# Patient Record
Sex: MALE | Race: WHITE | Employment: FULL TIME | URBAN - METROPOLITAN AREA
[De-identification: names, ages, dates, MRNs, and addresses within clinical notes are randomized per-mention and may not be internally consistent; named-entity substitution may affect disease eponyms.]

---

## 2018-07-26 ENCOUNTER — HOSPITAL ENCOUNTER (OUTPATIENT)
Age: 58
Discharge: HOME | End: 2018-07-26
Attending: INTERNAL MEDICINE
Payer: COMMERCIAL

## 2018-07-26 DIAGNOSIS — K86.2 PANCREAS CYST: ICD-10-CM

## 2018-07-26 DIAGNOSIS — R93.3 ABNORMAL FINDINGS ON DIAGNOSTIC IMAGING OF DIGESTIVE SYSTEM: ICD-10-CM

## 2018-07-26 LAB — CREAT UR-MCNC: 1.1 MG/DL (ref 0.6–1.3)

## 2018-07-26 PROCEDURE — 74183 MRI ABD W/O CNTR FLWD CNTR: CPT

## 2018-07-26 PROCEDURE — A9585 GADOBUTROL INJECTION: HCPCS | Performed by: INTERNAL MEDICINE

## 2018-07-26 PROCEDURE — 82565 ASSAY OF CREATININE: CPT

## 2018-07-26 PROCEDURE — 74011250636 HC RX REV CODE- 250/636: Performed by: INTERNAL MEDICINE

## 2018-07-26 RX ADMIN — GADOBUTROL 10 ML: 604.72 INJECTION INTRAVENOUS at 08:00

## 2019-04-16 PROBLEM — S61.209A WOUND, OPEN, FINGER: Status: ACTIVE | Noted: 2017-01-30

## 2019-04-16 PROBLEM — M54.9 BACKACHE: Status: ACTIVE | Noted: 2019-04-16

## 2021-03-29 PROBLEM — M75.121 COMPLETE ROTATOR CUFF TEAR OR RUPTURE OF RIGHT SHOULDER, NOT SPECIFIED AS TRAUMATIC: Status: ACTIVE | Noted: 2021-03-29

## 2022-06-16 RX ORDER — LOSARTAN POTASSIUM 25 MG/1
TABLET ORAL DAILY
COMMUNITY

## 2022-06-16 NOTE — PERIOP NOTES
PRE-SURGICAL INSTRUCTIONS        Patient's Name:  Jaime Bobby      Today's Date:  6/16/2022            Covid Testing Date and Time:    Surgery Date:  6/20/2022                1. Do NOT eat or drink anything, including candy, gum, or ice chips after midnight on 6/19/2022, unless you have specific instructions from your surgeon or anesthesia provider to do so.  2. You may brush your teeth before coming to the hospital.  3. No smoking 24 hours prior to the day of surgery. 4. No alcohol 24 hours prior to the day of surgery. 5. No recreational drugs for one week prior to the day of surgery. 6. Leave all valuables, including money/purse, at home. 7. Remove all jewelry, nail polish, acrylic nails, and makeup (including mascara); no lotions powders, deodorant, or perfume/cologne/after shave on the skin. 8. Follow instruction for Hibiclens washes and CHG wipes from surgeon's office. 9. Glasses/contact lenses and dentures may be worn to the hospital.  They will be removed prior to surgery. 10. Call your doctor if symptoms of a cold or illness develop within 24-48 hours prior to your surgery. 11.  If you are having an outpatient procedure, please make arrangements for a responsible ADULT TO [address] [place] [address] and stay with you for 24 hours after your surgery. 12. ONE VISITOR in the hospital at this time for outpatient procedures. Exceptions may be made for surgical admissions, per nursing unit guidelines      Special Instructions:      Bring list of CURRENT medications. Bring inhaler. Bring CPAP machine. Bring any pertinent legal medical records. Take these medications the morning of surgery with a sip of water: per MD  Follow physician instructions about insulin. Follow physician instructions about stopping anticoagulants. Complete bowel prep per MD instructions. On the day of surgery, come in the main entrance of [facility].   Let the  at the desk know you are there for surgery. A staff member will come escort you to the surgical area on the second floor. If you have any questions or concerns, please do not hesitate to call:     (Prior to the day of surgery) [facility] department:  [phone]   (Day of surgery) Pre-Op department:  [phone]    These surgical instructions were reviewed with patient during the [facility] phone call.

## 2022-06-19 ENCOUNTER — ANESTHESIA EVENT (OUTPATIENT)
Dept: ENDOSCOPY | Age: 62
End: 2022-06-19
Payer: COMMERCIAL

## 2022-06-20 ENCOUNTER — ANESTHESIA (OUTPATIENT)
Dept: ENDOSCOPY | Age: 62
End: 2022-06-20
Payer: COMMERCIAL

## 2022-06-20 ENCOUNTER — APPOINTMENT (OUTPATIENT)
Dept: PREADMISSION TESTING | Age: 62
End: 2022-06-20
Payer: COMMERCIAL

## 2022-06-20 ENCOUNTER — HOSPITAL ENCOUNTER (OUTPATIENT)
Age: 62
Setting detail: OUTPATIENT SURGERY
Discharge: HOME | End: 2022-06-20
Attending: INTERNAL MEDICINE | Admitting: INTERNAL MEDICINE
Payer: COMMERCIAL

## 2022-06-20 VITALS
OXYGEN SATURATION: 98 % | HEIGHT: 67 IN | TEMPERATURE: 99.3 F | RESPIRATION RATE: 12 BRPM | DIASTOLIC BLOOD PRESSURE: 83 MMHG | HEART RATE: 85 BPM | WEIGHT: 171.2 LBS | SYSTOLIC BLOOD PRESSURE: 155 MMHG | BODY MASS INDEX: 26.87 KG/M2

## 2022-06-20 LAB
ANION GAP SERPL CALC-SCNC: 5 MMOL/L (ref 3–18)
BUN SERPL-MCNC: 9 MG/DL (ref 7–18)
BUN/CREAT SERPL: 14 (ref 12–20)
CALCIUM SERPL-MCNC: 9.1 MG/DL (ref 8.5–10.1)
CHLORIDE SERPL-SCNC: 101 MMOL/L (ref 100–111)
CO2 SERPL-SCNC: 36 MMOL/L (ref 21–32)
COVID-19 RAPID TEST, COVR: NOT DETECTED
CREAT SERPL-MCNC: 0.64 MG/DL (ref 0.6–1.3)
GLUCOSE SERPL-MCNC: 99 MG/DL (ref 74–99)
POTASSIUM SERPL-SCNC: 2.4 MMOL/L (ref 3.5–5.5)
SODIUM SERPL-SCNC: 142 MMOL/L (ref 136–145)
SOURCE, COVRS: NORMAL

## 2022-06-20 PROCEDURE — 87635 SARS-COV-2 COVID-19 AMP PRB: CPT

## 2022-06-20 PROCEDURE — 94762 N-INVAS EAR/PLS OXIMTRY CONT: CPT

## 2022-06-20 PROCEDURE — 74011250636 HC RX REV CODE- 250/636: Performed by: NURSE ANESTHETIST, CERTIFIED REGISTERED

## 2022-06-20 PROCEDURE — 76040000019: Performed by: INTERNAL MEDICINE

## 2022-06-20 PROCEDURE — 2709999900 HC NON-CHARGEABLE SUPPLY: Performed by: INTERNAL MEDICINE

## 2022-06-20 PROCEDURE — 77030008565 HC TBNG SUC IRR ERBE -B: Performed by: INTERNAL MEDICINE

## 2022-06-20 PROCEDURE — 74011250636 HC RX REV CODE- 250/636: Performed by: ANESTHESIOLOGY

## 2022-06-20 PROCEDURE — 74011250636 HC RX REV CODE- 250/636: Performed by: STUDENT IN AN ORGANIZED HEALTH CARE EDUCATION/TRAINING PROGRAM

## 2022-06-20 PROCEDURE — 80048 BASIC METABOLIC PNL TOTAL CA: CPT

## 2022-06-20 PROCEDURE — 77010033678 HC OXYGEN DAILY

## 2022-06-20 PROCEDURE — 74011000250 HC RX REV CODE- 250: Performed by: NURSE ANESTHETIST, CERTIFIED REGISTERED

## 2022-06-20 PROCEDURE — 76060000031 HC ANESTHESIA FIRST 0.5 HR: Performed by: INTERNAL MEDICINE

## 2022-06-20 RX ORDER — SODIUM CHLORIDE, SODIUM LACTATE, POTASSIUM CHLORIDE, CALCIUM CHLORIDE 600; 310; 30; 20 MG/100ML; MG/100ML; MG/100ML; MG/100ML
75 INJECTION, SOLUTION INTRAVENOUS CONTINUOUS
Status: DISCONTINUED | OUTPATIENT
Start: 2022-06-20 | End: 2022-06-21 | Stop reason: HOSPADM

## 2022-06-20 RX ORDER — SODIUM CHLORIDE 0.9 % (FLUSH) 0.9 %
5-40 SYRINGE (ML) INJECTION EVERY 8 HOURS
Status: DISCONTINUED | OUTPATIENT
Start: 2022-06-20 | End: 2022-06-21 | Stop reason: HOSPADM

## 2022-06-20 RX ORDER — ONDANSETRON 2 MG/ML
4 INJECTION INTRAMUSCULAR; INTRAVENOUS ONCE
Status: DISCONTINUED | OUTPATIENT
Start: 2022-06-20 | End: 2022-06-21 | Stop reason: HOSPADM

## 2022-06-20 RX ORDER — SODIUM CHLORIDE 0.9 % (FLUSH) 0.9 %
5-40 SYRINGE (ML) INJECTION AS NEEDED
Status: DISCONTINUED | OUTPATIENT
Start: 2022-06-20 | End: 2022-06-21 | Stop reason: HOSPADM

## 2022-06-20 RX ORDER — POTASSIUM CHLORIDE 7.45 MG/ML
10 INJECTION INTRAVENOUS ONCE
Status: COMPLETED | OUTPATIENT
Start: 2022-06-20 | End: 2022-06-20

## 2022-06-20 RX ORDER — SODIUM CHLORIDE, SODIUM LACTATE, POTASSIUM CHLORIDE, CALCIUM CHLORIDE 600; 310; 30; 20 MG/100ML; MG/100ML; MG/100ML; MG/100ML
25 INJECTION, SOLUTION INTRAVENOUS CONTINUOUS
Status: DISCONTINUED | OUTPATIENT
Start: 2022-06-20 | End: 2022-06-21 | Stop reason: HOSPADM

## 2022-06-20 RX ORDER — ONDANSETRON 2 MG/ML
INJECTION INTRAMUSCULAR; INTRAVENOUS AS NEEDED
Status: DISCONTINUED | OUTPATIENT
Start: 2022-06-20 | End: 2022-06-20 | Stop reason: HOSPADM

## 2022-06-20 RX ORDER — POTASSIUM CHLORIDE 7.45 MG/ML
10 INJECTION INTRAVENOUS
Status: COMPLETED | OUTPATIENT
Start: 2022-06-20 | End: 2022-06-20

## 2022-06-20 RX ADMIN — POTASSIUM CHLORIDE 10 MEQ: 7.46 INJECTION, SOLUTION INTRAVENOUS at 16:11

## 2022-06-20 RX ADMIN — SODIUM CHLORIDE, PRESERVATIVE FREE 20 MG: 5 INJECTION INTRAVENOUS at 12:50

## 2022-06-20 RX ADMIN — ONDANSETRON 4 MG: 2 INJECTION INTRAMUSCULAR; INTRAVENOUS at 13:59

## 2022-06-20 RX ADMIN — SODIUM CHLORIDE, SODIUM LACTATE, POTASSIUM CHLORIDE, AND CALCIUM CHLORIDE 75 ML/HR: 600; 310; 30; 20 INJECTION, SOLUTION INTRAVENOUS at 12:50

## 2022-06-20 RX ADMIN — POTASSIUM CHLORIDE 10 MEQ: 7.46 INJECTION, SOLUTION INTRAVENOUS at 15:00

## 2022-06-20 RX ADMIN — POTASSIUM CHLORIDE 10 MEQ: 7.46 INJECTION, SOLUTION INTRAVENOUS at 13:21

## 2022-06-20 NOTE — ANESTHESIA PREPROCEDURE EVALUATION
Relevant Problems   NEUROLOGY   (+) Anxiety and depression   (+) Psychiatric disorder      CARDIOVASCULAR   (+) Hypertension   (+) Right bundle branch block      GASTROINTESTINAL   (+) GERD (gastroesophageal reflux disease)      ENDOCRINE   (+) Arthritis       Anesthetic History     PONV          Review of Systems / Medical History  Patient summary reviewed and pertinent labs reviewed    Pulmonary  Within defined limits                 Neuro/Psych         Psychiatric history     Cardiovascular    Hypertension        Dysrhythmias            GI/Hepatic/Renal     GERD: well controlled          Comments: Mass in recto-sigmoid region. Poor prep due to intolerance of prep. Lots of N/V. Also critically low potassium levels which we will address pre-procedure.  Endo/Other        Arthritis     Other Findings              Physical Exam    Airway  Mallampati: II  TM Distance: 4 - 6 cm    Mouth opening: Normal     Cardiovascular    Rhythm: regular  Rate: normal         Dental  No notable dental hx       Pulmonary  Breath sounds clear to auscultation               Abdominal  GI exam deferred       Other Findings            Anesthetic Plan    ASA: 3  Anesthesia type: MAC          Induction: Intravenous  Anesthetic plan and risks discussed with: Patient

## 2022-06-20 NOTE — ANESTHESIA POSTPROCEDURE EVALUATION
Procedure(s):  SIGMOIDOSCOPY FLEXIBLE.     MAC    Anesthesia Post Evaluation      Multimodal analgesia: multimodal analgesia used between 6 hours prior to anesthesia start to PACU discharge  Patient location during evaluation: PACU  Patient participation: complete - patient participated  Level of consciousness: awake and alert  Pain management: adequate  Airway patency: patent  Anesthetic complications: no  Cardiovascular status: acceptable  Respiratory status: acceptable  Hydration status: acceptable  Post anesthesia nausea and vomiting:  none  Final Post Anesthesia Temperature Assessment:  Normothermia (36.0-37.5 degrees C)      INITIAL Post-op Vital signs:   Vitals Value Taken Time   /83 06/20/22 1415   Temp 37.4 °C (99.3 °F) 06/20/22 1415   Pulse 85 06/20/22 1415   Resp 12 06/20/22 1415   SpO2 98 % 06/20/22 1415
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
## 2022-08-29 ENCOUNTER — APPOINTMENT (OUTPATIENT)
Dept: CT IMAGING | Age: 62
DRG: 392 | End: 2022-08-29
Attending: EMERGENCY MEDICINE
Payer: COMMERCIAL

## 2022-08-29 ENCOUNTER — HOSPITAL ENCOUNTER (INPATIENT)
Age: 62
LOS: 5 days | Discharge: HOME | DRG: 392 | End: 2022-09-03
Attending: EMERGENCY MEDICINE | Admitting: INTERNAL MEDICINE
Payer: COMMERCIAL

## 2022-08-29 DIAGNOSIS — K57.20 DIVERTICULITIS OF LARGE INTESTINE WITH ABSCESS WITHOUT BLEEDING: ICD-10-CM

## 2022-08-29 DIAGNOSIS — I63.9 CEREBELLAR INFARCT (HCC): Primary | ICD-10-CM

## 2022-08-29 DIAGNOSIS — E87.6 HYPOKALEMIA: ICD-10-CM

## 2022-08-29 DIAGNOSIS — E86.0 DEHYDRATION: ICD-10-CM

## 2022-08-29 DIAGNOSIS — N17.9 AKI (ACUTE KIDNEY INJURY) (HCC): ICD-10-CM

## 2022-08-29 PROBLEM — G45.9 TIA (TRANSIENT ISCHEMIC ATTACK): Status: ACTIVE | Noted: 2022-08-29

## 2022-08-29 PROBLEM — I95.9 HYPOTENSION: Status: ACTIVE | Noted: 2022-08-29

## 2022-08-29 LAB
ALBUMIN SERPL-MCNC: 2.9 G/DL (ref 3.4–5)
ALBUMIN/GLOB SERPL: 0.8 {RATIO} (ref 0.8–1.7)
ALP SERPL-CCNC: 85 U/L (ref 45–117)
ALT SERPL-CCNC: 28 U/L (ref 16–61)
ANION GAP SERPL CALC-SCNC: 4 MMOL/L (ref 3–18)
ANION GAP SERPL CALC-SCNC: 4 MMOL/L (ref 3–18)
APPEARANCE UR: ABNORMAL
AST SERPL-CCNC: 15 U/L (ref 10–38)
ATRIAL RATE: 72 BPM
BACTERIA URNS QL MICRO: ABNORMAL /HPF
BASOPHILS # BLD: 0.1 K/UL (ref 0–0.1)
BASOPHILS NFR BLD: 0 % (ref 0–2)
BILIRUB SERPL-MCNC: 0.3 MG/DL (ref 0.2–1)
BILIRUB UR QL: NEGATIVE
BUN SERPL-MCNC: 15 MG/DL (ref 7–18)
BUN SERPL-MCNC: 16 MG/DL (ref 7–18)
BUN/CREAT SERPL: 7 (ref 12–20)
BUN/CREAT SERPL: 9 (ref 12–20)
CALCIUM SERPL-MCNC: 7.7 MG/DL (ref 8.5–10.1)
CALCIUM SERPL-MCNC: 8.9 MG/DL (ref 8.5–10.1)
CALCULATED P AXIS, ECG09: 59 DEGREES
CALCULATED R AXIS, ECG10: -26 DEGREES
CALCULATED T AXIS, ECG11: 16 DEGREES
CHLORIDE SERPL-SCNC: 101 MMOL/L (ref 100–111)
CHLORIDE SERPL-SCNC: 97 MMOL/L (ref 100–111)
CO2 SERPL-SCNC: 33 MMOL/L (ref 21–32)
CO2 SERPL-SCNC: 36 MMOL/L (ref 21–32)
COLOR UR: ABNORMAL
CREAT SERPL-MCNC: 1.8 MG/DL (ref 0.6–1.3)
CREAT SERPL-MCNC: 2.16 MG/DL (ref 0.6–1.3)
DIAGNOSIS, 93000: NORMAL
DIFFERENTIAL METHOD BLD: ABNORMAL
EOSINOPHIL # BLD: 0.3 K/UL (ref 0–0.4)
EOSINOPHIL NFR BLD: 2 % (ref 0–5)
EPITH CASTS URNS QL MICRO: NEGATIVE /LPF (ref 0–5)
ERYTHROCYTE [DISTWIDTH] IN BLOOD BY AUTOMATED COUNT: 13.6 % (ref 11.6–14.5)
GLOBULIN SER CALC-MCNC: 3.6 G/DL (ref 2–4)
GLUCOSE BLD STRIP.AUTO-MCNC: 133 MG/DL (ref 70–110)
GLUCOSE SERPL-MCNC: 127 MG/DL (ref 74–99)
GLUCOSE SERPL-MCNC: 135 MG/DL (ref 74–99)
GLUCOSE UR STRIP.AUTO-MCNC: NEGATIVE MG/DL
HCT VFR BLD AUTO: 33.3 % (ref 36–48)
HGB BLD-MCNC: 11.1 G/DL (ref 13–16)
HGB UR QL STRIP: ABNORMAL
IMM GRANULOCYTES # BLD AUTO: 0.1 K/UL (ref 0–0.04)
IMM GRANULOCYTES NFR BLD AUTO: 1 % (ref 0–0.5)
KETONES UR QL STRIP.AUTO: ABNORMAL MG/DL
LEUKOCYTE ESTERASE UR QL STRIP.AUTO: ABNORMAL
LYMPHOCYTES # BLD: 1.2 K/UL (ref 0.9–3.6)
LYMPHOCYTES NFR BLD: 10 % (ref 21–52)
MAGNESIUM SERPL-MCNC: 1.8 MG/DL (ref 1.6–2.6)
MCH RBC QN AUTO: 29.1 PG (ref 24–34)
MCHC RBC AUTO-ENTMCNC: 33.3 G/DL (ref 31–37)
MCV RBC AUTO: 87.4 FL (ref 78–100)
MONOCYTES # BLD: 0.7 K/UL (ref 0.05–1.2)
MONOCYTES NFR BLD: 5 % (ref 3–10)
NEUTS SEG # BLD: 9.8 K/UL (ref 1.8–8)
NEUTS SEG NFR BLD: 82 % (ref 40–73)
NITRITE UR QL STRIP.AUTO: NEGATIVE
NRBC # BLD: 0 K/UL (ref 0–0.01)
NRBC BLD-RTO: 0 PER 100 WBC
P-R INTERVAL, ECG05: 144 MS
PH UR STRIP: 5.5 [PH] (ref 5–8)
PLATELET # BLD AUTO: 508 K/UL (ref 135–420)
PMV BLD AUTO: 9.8 FL (ref 9.2–11.8)
POTASSIUM SERPL-SCNC: 2.5 MMOL/L (ref 3.5–5.5)
POTASSIUM SERPL-SCNC: 2.7 MMOL/L (ref 3.5–5.5)
PROT SERPL-MCNC: 6.5 G/DL (ref 6.4–8.2)
PROT UR STRIP-MCNC: 300 MG/DL
Q-T INTERVAL, ECG07: 478 MS
QRS DURATION, ECG06: 178 MS
QTC CALCULATION (BEZET), ECG08: 523 MS
RBC # BLD AUTO: 3.81 M/UL (ref 4.35–5.65)
RBC #/AREA URNS HPF: ABNORMAL /HPF (ref 0–5)
SODIUM SERPL-SCNC: 137 MMOL/L (ref 136–145)
SODIUM SERPL-SCNC: 138 MMOL/L (ref 136–145)
SP GR UR REFRACTOMETRY: 1.02 (ref 1–1.03)
TROPONIN-HIGH SENSITIVITY: 46 NG/L (ref 0–78)
UROBILINOGEN UR QL STRIP.AUTO: 1 EU/DL (ref 0.2–1)
VENTRICULAR RATE, ECG03: 72 BPM
WBC # BLD AUTO: 12 K/UL (ref 4.6–13.2)
WBC URNS QL MICRO: ABNORMAL /HPF (ref 0–4)

## 2022-08-29 PROCEDURE — 96360 HYDRATION IV INFUSION INIT: CPT

## 2022-08-29 PROCEDURE — 85025 COMPLETE CBC W/AUTO DIFF WBC: CPT

## 2022-08-29 PROCEDURE — 87086 URINE CULTURE/COLONY COUNT: CPT

## 2022-08-29 PROCEDURE — 65660000004 HC RM CVT STEPDOWN

## 2022-08-29 PROCEDURE — 74011250637 HC RX REV CODE- 250/637: Performed by: STUDENT IN AN ORGANIZED HEALTH CARE EDUCATION/TRAINING PROGRAM

## 2022-08-29 PROCEDURE — 82962 GLUCOSE BLOOD TEST: CPT

## 2022-08-29 PROCEDURE — 99285 EMERGENCY DEPT VISIT HI MDM: CPT

## 2022-08-29 PROCEDURE — 74011000250 HC RX REV CODE- 250: Performed by: STUDENT IN AN ORGANIZED HEALTH CARE EDUCATION/TRAINING PROGRAM

## 2022-08-29 PROCEDURE — 84484 ASSAY OF TROPONIN QUANT: CPT

## 2022-08-29 PROCEDURE — 77030018842 HC SOL IRR SOD CL 9% BAXT -A

## 2022-08-29 PROCEDURE — 87077 CULTURE AEROBIC IDENTIFY: CPT

## 2022-08-29 PROCEDURE — 81001 URINALYSIS AUTO W/SCOPE: CPT

## 2022-08-29 PROCEDURE — 2709999900 HC NON-CHARGEABLE SUPPLY

## 2022-08-29 PROCEDURE — 70450 CT HEAD/BRAIN W/O DYE: CPT

## 2022-08-29 PROCEDURE — 74011000250 HC RX REV CODE- 250: Performed by: NURSE PRACTITIONER

## 2022-08-29 PROCEDURE — APPSS45 APP SPLIT SHARED TIME 31-45 MINUTES: Performed by: NURSE PRACTITIONER

## 2022-08-29 PROCEDURE — 87186 SC STD MICRODIL/AGAR DIL: CPT

## 2022-08-29 PROCEDURE — 74011250637 HC RX REV CODE- 250/637: Performed by: NURSE PRACTITIONER

## 2022-08-29 PROCEDURE — 93005 ELECTROCARDIOGRAM TRACING: CPT

## 2022-08-29 PROCEDURE — 80053 COMPREHEN METABOLIC PANEL: CPT

## 2022-08-29 PROCEDURE — 74011250636 HC RX REV CODE- 250/636: Performed by: EMERGENCY MEDICINE

## 2022-08-29 PROCEDURE — 74011250636 HC RX REV CODE- 250/636: Performed by: NURSE PRACTITIONER

## 2022-08-29 PROCEDURE — 74011250637 HC RX REV CODE- 250/637: Performed by: EMERGENCY MEDICINE

## 2022-08-29 PROCEDURE — 74011250636 HC RX REV CODE- 250/636: Performed by: STUDENT IN AN ORGANIZED HEALTH CARE EDUCATION/TRAINING PROGRAM

## 2022-08-29 PROCEDURE — 83735 ASSAY OF MAGNESIUM: CPT

## 2022-08-29 RX ORDER — SODIUM CHLORIDE 9 MG/ML
125 INJECTION, SOLUTION INTRAVENOUS CONTINUOUS
Status: DISPENSED | OUTPATIENT
Start: 2022-08-29 | End: 2022-08-30

## 2022-08-29 RX ORDER — POTASSIUM CHLORIDE 20 MEQ/1
40 TABLET, EXTENDED RELEASE ORAL
Status: COMPLETED | OUTPATIENT
Start: 2022-08-29 | End: 2022-08-29

## 2022-08-29 RX ORDER — POTASSIUM CHLORIDE 7.45 MG/ML
10 INJECTION INTRAVENOUS
Status: DISPENSED | OUTPATIENT
Start: 2022-08-29 | End: 2022-08-29

## 2022-08-29 RX ORDER — CALCIUM CARB/MAGNESIUM CARB 311-232MG
5 TABLET ORAL
Status: DISCONTINUED | OUTPATIENT
Start: 2022-08-29 | End: 2022-09-03 | Stop reason: HOSPADM

## 2022-08-29 RX ORDER — POTASSIUM CHLORIDE 7.45 MG/ML
10 INJECTION INTRAVENOUS
Status: COMPLETED | OUTPATIENT
Start: 2022-08-29 | End: 2022-08-29

## 2022-08-29 RX ORDER — GABAPENTIN 300 MG/1
300 CAPSULE ORAL
Status: DISCONTINUED | OUTPATIENT
Start: 2022-08-29 | End: 2022-09-03 | Stop reason: HOSPADM

## 2022-08-29 RX ORDER — NORTRIPTYLINE HYDROCHLORIDE 25 MG/1
25 CAPSULE ORAL
Status: DISCONTINUED | OUTPATIENT
Start: 2022-08-29 | End: 2022-09-03 | Stop reason: HOSPADM

## 2022-08-29 RX ORDER — MELATONIN 5 MG
5 CAPSULE ORAL
COMMUNITY

## 2022-08-29 RX ORDER — ONDANSETRON 2 MG/ML
4 INJECTION INTRAMUSCULAR; INTRAVENOUS ONCE
Status: COMPLETED | OUTPATIENT
Start: 2022-08-29 | End: 2022-08-29

## 2022-08-29 RX ORDER — HEPARIN SODIUM 5000 [USP'U]/ML
5000 INJECTION, SOLUTION INTRAVENOUS; SUBCUTANEOUS EVERY 8 HOURS
Status: DISCONTINUED | OUTPATIENT
Start: 2022-08-29 | End: 2022-09-03 | Stop reason: HOSPADM

## 2022-08-29 RX ORDER — CALCIUM CARB/MAGNESIUM CARB 311-232MG
5 TABLET ORAL
Status: DISCONTINUED | OUTPATIENT
Start: 2022-08-29 | End: 2022-08-29

## 2022-08-29 RX ORDER — ASPIRIN 325 MG
325 TABLET ORAL DAILY
Status: DISCONTINUED | OUTPATIENT
Start: 2022-08-30 | End: 2022-09-02

## 2022-08-29 RX ORDER — CALCIUM CARBONATE 200(500)MG
200 TABLET,CHEWABLE ORAL
Status: DISCONTINUED | OUTPATIENT
Start: 2022-08-29 | End: 2022-09-02

## 2022-08-29 RX ORDER — POTASSIUM CHLORIDE 7.45 MG/ML
10 INJECTION INTRAVENOUS
Status: ACTIVE | OUTPATIENT
Start: 2022-08-30 | End: 2022-08-30

## 2022-08-29 RX ORDER — PROMETHAZINE HYDROCHLORIDE 12.5 MG/1
25 TABLET ORAL
Status: DISCONTINUED | OUTPATIENT
Start: 2022-08-29 | End: 2022-09-03 | Stop reason: HOSPADM

## 2022-08-29 RX ORDER — LORAZEPAM 1 MG/1
1 TABLET ORAL DAILY
Status: DISCONTINUED | OUTPATIENT
Start: 2022-08-30 | End: 2022-09-03 | Stop reason: HOSPADM

## 2022-08-29 RX ORDER — ATORVASTATIN CALCIUM 40 MG/1
80 TABLET, FILM COATED ORAL
Status: DISCONTINUED | OUTPATIENT
Start: 2022-08-29 | End: 2022-09-02

## 2022-08-29 RX ORDER — LORAZEPAM 2 MG/ML
0.5 INJECTION, SOLUTION INTRAMUSCULAR; INTRAVENOUS
Status: DISCONTINUED | OUTPATIENT
Start: 2022-08-29 | End: 2022-09-03 | Stop reason: HOSPADM

## 2022-08-29 RX ORDER — HYDRALAZINE HYDROCHLORIDE 20 MG/ML
10 INJECTION INTRAMUSCULAR; INTRAVENOUS
Status: DISCONTINUED | OUTPATIENT
Start: 2022-08-29 | End: 2022-09-03 | Stop reason: HOSPADM

## 2022-08-29 RX ORDER — ASPIRIN 325 MG
325 TABLET ORAL ONCE
Status: COMPLETED | OUTPATIENT
Start: 2022-08-29 | End: 2022-08-29

## 2022-08-29 RX ADMIN — Medication 5 MG: at 23:15

## 2022-08-29 RX ADMIN — LORAZEPAM 0.5 MG: 2 INJECTION, SOLUTION INTRAMUSCULAR; INTRAVENOUS at 23:30

## 2022-08-29 RX ADMIN — POTASSIUM CHLORIDE 10 MEQ: 7.46 INJECTION, SOLUTION INTRAVENOUS at 21:21

## 2022-08-29 RX ADMIN — NORTRIPTYLINE HYDROCHLORIDE 25 MG: 25 CAPSULE ORAL at 23:15

## 2022-08-29 RX ADMIN — POTASSIUM CHLORIDE 10 MEQ: 7.46 INJECTION, SOLUTION INTRAVENOUS at 18:57

## 2022-08-29 RX ADMIN — SODIUM CHLORIDE 125 ML/HR: 900 INJECTION, SOLUTION INTRAVENOUS at 21:15

## 2022-08-29 RX ADMIN — ONDANSETRON 4 MG: 2 INJECTION INTRAMUSCULAR; INTRAVENOUS at 18:32

## 2022-08-29 RX ADMIN — FAMOTIDINE 20 MG: 10 INJECTION, SOLUTION INTRAVENOUS at 23:18

## 2022-08-29 RX ADMIN — SODIUM CHLORIDE 125 ML/HR: 900 INJECTION, SOLUTION INTRAVENOUS at 16:26

## 2022-08-29 RX ADMIN — SODIUM CHLORIDE 1000 ML: 900 INJECTION, SOLUTION INTRAVENOUS at 15:04

## 2022-08-29 RX ADMIN — POTASSIUM CHLORIDE 10 MEQ: 7.46 INJECTION, SOLUTION INTRAVENOUS at 15:04

## 2022-08-29 RX ADMIN — HEPARIN SODIUM 5000 UNITS: 5000 INJECTION INTRAVENOUS; SUBCUTANEOUS at 23:23

## 2022-08-29 RX ADMIN — POTASSIUM CHLORIDE 40 MEQ: 1500 TABLET, EXTENDED RELEASE ORAL at 14:58

## 2022-08-29 RX ADMIN — CALCIUM CARBONATE (ANTACID) CHEW TAB 500 MG 200 MG: 500 CHEW TAB at 21:15

## 2022-08-29 RX ADMIN — ATORVASTATIN CALCIUM 80 MG: 40 TABLET, FILM COATED ORAL at 23:15

## 2022-08-29 RX ADMIN — SODIUM CHLORIDE 1000 ML: 900 INJECTION, SOLUTION INTRAVENOUS at 14:58

## 2022-08-29 RX ADMIN — POTASSIUM CHLORIDE 10 MEQ: 7.46 INJECTION, SOLUTION INTRAVENOUS at 23:31

## 2022-08-29 RX ADMIN — WATER 1 G: 1 INJECTION INTRAMUSCULAR; INTRAVENOUS; SUBCUTANEOUS at 18:32

## 2022-08-29 RX ADMIN — ASPIRIN 325 MG ORAL TABLET 325 MG: 325 PILL ORAL at 15:56

## 2022-08-29 RX ADMIN — GABAPENTIN 300 MG: 300 CAPSULE ORAL at 23:15

## 2022-08-29 NOTE — ED TRIAGE NOTES
Patient's wife states that they just returned from a cruise to [facility], and [facility]. States that patient stayed in state room in bed and did not eat during cruise. States that patient was stressed about missing work. Wife states that patient experienced fall today after knees gave way. She states patient's mentation is off today.

## 2022-08-29 NOTE — ED NOTES
Pt medicated as per MAR. Pt sitting in semi flowers position, call bell within reach, wife at bedside.
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
## 2022-08-30 ENCOUNTER — APPOINTMENT (OUTPATIENT)
Dept: MRI IMAGING | Age: 62
DRG: 392 | End: 2022-08-30
Attending: STUDENT IN AN ORGANIZED HEALTH CARE EDUCATION/TRAINING PROGRAM
Payer: COMMERCIAL

## 2022-08-30 ENCOUNTER — APPOINTMENT (OUTPATIENT)
Dept: NON INVASIVE DIAGNOSTICS | Age: 62
DRG: 392 | End: 2022-08-30
Attending: STUDENT IN AN ORGANIZED HEALTH CARE EDUCATION/TRAINING PROGRAM
Payer: COMMERCIAL

## 2022-08-30 ENCOUNTER — APPOINTMENT (OUTPATIENT)
Dept: CT IMAGING | Age: 62
DRG: 392 | End: 2022-08-30
Attending: HOSPITALIST
Payer: COMMERCIAL

## 2022-08-30 LAB
ANION GAP SERPL CALC-SCNC: 8 MMOL/L (ref 3–18)
BUN SERPL-MCNC: 13 MG/DL (ref 7–18)
BUN/CREAT SERPL: 11 (ref 12–20)
CALCIUM SERPL-MCNC: 7.9 MG/DL (ref 8.5–10.1)
CHLORIDE SERPL-SCNC: 105 MMOL/L (ref 100–111)
CHOLEST SERPL-MCNC: 166 MG/DL
CO2 SERPL-SCNC: 29 MMOL/L (ref 21–32)
CREAT SERPL-MCNC: 1.17 MG/DL (ref 0.6–1.3)
ECHO AO ASC DIAM: 3.5 CM
ECHO AO ASCENDING AORTA INDEX: 1.83 CM/M2
ECHO AO ROOT DIAM: 3.3 CM
ECHO AO ROOT INDEX: 1.73 CM/M2
ECHO AV AREA PEAK VELOCITY: 1.8 CM2
ECHO AV AREA VTI: 1.8 CM2
ECHO AV AREA/BSA PEAK VELOCITY: 0.9 CM2/M2
ECHO AV AREA/BSA VTI: 0.9 CM2/M2
ECHO AV MEAN GRADIENT: 13 MMHG
ECHO AV MEAN VELOCITY: 1.7 M/S
ECHO AV PEAK GRADIENT: 23 MMHG
ECHO AV PEAK VELOCITY: 2.4 M/S
ECHO AV VELOCITY RATIO: 0.5
ECHO AV VTI: 44.7 CM
ECHO LA VOL 2C: 66 ML (ref 18–58)
ECHO LA VOL 4C: 59 ML (ref 18–58)
ECHO LA VOLUME AREA LENGTH: 70 ML
ECHO LA VOLUME INDEX A2C: 35 ML/M2 (ref 16–34)
ECHO LA VOLUME INDEX A4C: 31 ML/M2 (ref 16–34)
ECHO LA VOLUME INDEX AREA LENGTH: 37 ML/M2 (ref 16–34)
ECHO LV E' LATERAL VELOCITY: 10 CM/S
ECHO LV E' SEPTAL VELOCITY: 6 CM/S
ECHO LV FRACTIONAL SHORTENING: 21 % (ref 28–44)
ECHO LV INTERNAL DIMENSION DIASTOLE INDEX: 2.46 CM/M2
ECHO LV INTERNAL DIMENSION DIASTOLIC: 4.7 CM (ref 4.2–5.9)
ECHO LV INTERNAL DIMENSION SYSTOLIC INDEX: 1.94 CM/M2
ECHO LV INTERNAL DIMENSION SYSTOLIC: 3.7 CM
ECHO LV IVSD: 1.3 CM (ref 0.6–1)
ECHO LV MASS 2D: 224.8 G (ref 88–224)
ECHO LV MASS INDEX 2D: 117.7 G/M2 (ref 49–115)
ECHO LV POSTERIOR WALL DIASTOLIC: 1.2 CM (ref 0.6–1)
ECHO LV RELATIVE WALL THICKNESS RATIO: 0.51
ECHO LVOT AREA: 3.5 CM2
ECHO LVOT AV VTI INDEX: 0.53
ECHO LVOT DIAM: 2.1 CM
ECHO LVOT MEAN GRADIENT: 4 MMHG
ECHO LVOT PEAK GRADIENT: 6 MMHG
ECHO LVOT PEAK VELOCITY: 1.2 M/S
ECHO LVOT STROKE VOLUME INDEX: 43.3 ML/M2
ECHO LVOT SV: 82.7 ML
ECHO LVOT VTI: 23.9 CM
ECHO MV A VELOCITY: 0.84 M/S
ECHO MV E DECELERATION TIME (DT): 273.2 MS
ECHO MV E VELOCITY: 0.71 M/S
ECHO MV E/A RATIO: 0.85
ECHO MV E/E' LATERAL: 7.1
ECHO MV E/E' RATIO (AVERAGED): 9.47
ECHO MV E/E' SEPTAL: 11.83
ECHO RV TAPSE: 2.1 CM (ref 1.7–?)
ECHO TV REGURGITANT MAX VELOCITY: 2.66 M/S
ECHO TV REGURGITANT PEAK GRADIENT: 28 MMHG
ERYTHROCYTE [DISTWIDTH] IN BLOOD BY AUTOMATED COUNT: 13.7 % (ref 11.6–14.5)
EST. AVERAGE GLUCOSE BLD GHB EST-MCNC: 111 MG/DL
GLUCOSE SERPL-MCNC: 102 MG/DL (ref 74–99)
HBA1C MFR BLD: 5.5 % (ref 4.2–5.6)
HCT VFR BLD AUTO: 32 % (ref 36–48)
HDLC SERPL-MCNC: 32 MG/DL (ref 40–60)
HDLC SERPL: 5.2 {RATIO} (ref 0–5)
HGB BLD-MCNC: 10.6 G/DL (ref 13–16)
LDLC SERPL CALC-MCNC: 111.2 MG/DL (ref 0–100)
LIPID PROFILE,FLP: ABNORMAL
MCH RBC QN AUTO: 28.7 PG (ref 24–34)
MCHC RBC AUTO-ENTMCNC: 33.1 G/DL (ref 31–37)
MCV RBC AUTO: 86.7 FL (ref 78–100)
NRBC # BLD: 0 K/UL (ref 0–0.01)
NRBC BLD-RTO: 0 PER 100 WBC
PLATELET # BLD AUTO: 442 K/UL (ref 135–420)
PMV BLD AUTO: 10.4 FL (ref 9.2–11.8)
POTASSIUM SERPL-SCNC: 3.3 MMOL/L (ref 3.5–5.5)
RBC # BLD AUTO: 3.69 M/UL (ref 4.35–5.65)
SODIUM SERPL-SCNC: 142 MMOL/L (ref 136–145)
TRIGL SERPL-MCNC: 114 MG/DL (ref ?–150)
VLDLC SERPL CALC-MCNC: 22.8 MG/DL
WBC # BLD AUTO: 11.8 K/UL (ref 4.6–13.2)

## 2022-08-30 PROCEDURE — 74011000250 HC RX REV CODE- 250: Performed by: NURSE PRACTITIONER

## 2022-08-30 PROCEDURE — 2709999900 HC NON-CHARGEABLE SUPPLY

## 2022-08-30 PROCEDURE — 99232 SBSQ HOSP IP/OBS MODERATE 35: CPT | Performed by: HOSPITALIST

## 2022-08-30 PROCEDURE — 74011250637 HC RX REV CODE- 250/637: Performed by: EMERGENCY MEDICINE

## 2022-08-30 PROCEDURE — 74011250637 HC RX REV CODE- 250/637: Performed by: NURSE PRACTITIONER

## 2022-08-30 PROCEDURE — 70551 MRI BRAIN STEM W/O DYE: CPT

## 2022-08-30 PROCEDURE — 74177 CT ABD & PELVIS W/CONTRAST: CPT

## 2022-08-30 PROCEDURE — 74011250637 HC RX REV CODE- 250/637: Performed by: STUDENT IN AN ORGANIZED HEALTH CARE EDUCATION/TRAINING PROGRAM

## 2022-08-30 PROCEDURE — 97535 SELF CARE MNGMENT TRAINING: CPT

## 2022-08-30 PROCEDURE — 80061 LIPID PANEL: CPT

## 2022-08-30 PROCEDURE — 99222 1ST HOSP IP/OBS MODERATE 55: CPT | Performed by: PSYCHIATRY & NEUROLOGY

## 2022-08-30 PROCEDURE — 74011250636 HC RX REV CODE- 250/636: Performed by: STUDENT IN AN ORGANIZED HEALTH CARE EDUCATION/TRAINING PROGRAM

## 2022-08-30 PROCEDURE — 93306 TTE W/DOPPLER COMPLETE: CPT

## 2022-08-30 PROCEDURE — 74011000250 HC RX REV CODE- 250: Performed by: HOSPITALIST

## 2022-08-30 PROCEDURE — 97530 THERAPEUTIC ACTIVITIES: CPT

## 2022-08-30 PROCEDURE — 80048 BASIC METABOLIC PNL TOTAL CA: CPT

## 2022-08-30 PROCEDURE — 74011000636 HC RX REV CODE- 636: Performed by: HOSPITALIST

## 2022-08-30 PROCEDURE — 74011250636 HC RX REV CODE- 250/636: Performed by: NURSE PRACTITIONER

## 2022-08-30 PROCEDURE — 92610 EVALUATE SWALLOWING FUNCTION: CPT

## 2022-08-30 PROCEDURE — 97161 PT EVAL LOW COMPLEX 20 MIN: CPT

## 2022-08-30 PROCEDURE — 85027 COMPLETE CBC AUTOMATED: CPT

## 2022-08-30 PROCEDURE — 97166 OT EVAL MOD COMPLEX 45 MIN: CPT

## 2022-08-30 PROCEDURE — 65660000004 HC RM CVT STEPDOWN

## 2022-08-30 PROCEDURE — 83036 HEMOGLOBIN GLYCOSYLATED A1C: CPT

## 2022-08-30 PROCEDURE — 74011250637 HC RX REV CODE- 250/637: Performed by: HOSPITALIST

## 2022-08-30 PROCEDURE — 92526 ORAL FUNCTION THERAPY: CPT

## 2022-08-30 PROCEDURE — 36415 COLL VENOUS BLD VENIPUNCTURE: CPT

## 2022-08-30 RX ORDER — FAMOTIDINE 20 MG/1
20 TABLET, FILM COATED ORAL 2 TIMES DAILY
Status: DISCONTINUED | OUTPATIENT
Start: 2022-08-30 | End: 2022-09-03 | Stop reason: HOSPADM

## 2022-08-30 RX ORDER — POTASSIUM CHLORIDE 20 MEQ/1
40 TABLET, EXTENDED RELEASE ORAL
Status: COMPLETED | OUTPATIENT
Start: 2022-08-30 | End: 2022-08-30

## 2022-08-30 RX ORDER — LANOLIN ALCOHOL/MO/W.PET/CERES
100 CREAM (GRAM) TOPICAL DAILY
Status: DISCONTINUED | OUTPATIENT
Start: 2022-08-31 | End: 2022-09-03 | Stop reason: HOSPADM

## 2022-08-30 RX ORDER — SODIUM CHLORIDE 9 MG/ML
10 INJECTION INTRAMUSCULAR; INTRAVENOUS; SUBCUTANEOUS
Status: COMPLETED | OUTPATIENT
Start: 2022-08-30 | End: 2022-08-30

## 2022-08-30 RX ORDER — THERA TABS 400 MCG
1 TAB ORAL DAILY
Status: DISCONTINUED | OUTPATIENT
Start: 2022-08-31 | End: 2022-09-03 | Stop reason: HOSPADM

## 2022-08-30 RX ORDER — AMLODIPINE BESYLATE 10 MG/1
10 TABLET ORAL DAILY
Status: DISCONTINUED | OUTPATIENT
Start: 2022-08-30 | End: 2022-09-03 | Stop reason: HOSPADM

## 2022-08-30 RX ADMIN — CALCIUM CARBONATE (ANTACID) CHEW TAB 500 MG 200 MG: 500 CHEW TAB at 08:23

## 2022-08-30 RX ADMIN — CALCIUM CARBONATE (ANTACID) CHEW TAB 500 MG 200 MG: 500 CHEW TAB at 17:22

## 2022-08-30 RX ADMIN — CALCIUM CARBONATE (ANTACID) CHEW TAB 500 MG 200 MG: 500 CHEW TAB at 12:19

## 2022-08-30 RX ADMIN — IOPAMIDOL 100 ML: 755 INJECTION, SOLUTION INTRAVENOUS at 16:48

## 2022-08-30 RX ADMIN — HYDRALAZINE HYDROCHLORIDE 10 MG: 20 INJECTION, SOLUTION INTRAMUSCULAR; INTRAVENOUS at 00:46

## 2022-08-30 RX ADMIN — SODIUM CHLORIDE 10 ML: 9 INJECTION INTRAMUSCULAR; INTRAVENOUS; SUBCUTANEOUS at 15:25

## 2022-08-30 RX ADMIN — ATORVASTATIN CALCIUM 80 MG: 40 TABLET, FILM COATED ORAL at 21:58

## 2022-08-30 RX ADMIN — WATER 1 G: 1 INJECTION INTRAMUSCULAR; INTRAVENOUS; SUBCUTANEOUS at 17:22

## 2022-08-30 RX ADMIN — NORTRIPTYLINE HYDROCHLORIDE 25 MG: 25 CAPSULE ORAL at 21:59

## 2022-08-30 RX ADMIN — HEPARIN SODIUM 5000 UNITS: 5000 INJECTION INTRAVENOUS; SUBCUTANEOUS at 05:39

## 2022-08-30 RX ADMIN — LORAZEPAM 1 MG: 1 TABLET ORAL at 08:23

## 2022-08-30 RX ADMIN — AMLODIPINE BESYLATE 10 MG: 10 TABLET ORAL at 04:11

## 2022-08-30 RX ADMIN — POTASSIUM CHLORIDE 10 MEQ: 7.46 INJECTION, SOLUTION INTRAVENOUS at 00:27

## 2022-08-30 RX ADMIN — HEPARIN SODIUM 5000 UNITS: 5000 INJECTION INTRAVENOUS; SUBCUTANEOUS at 22:00

## 2022-08-30 RX ADMIN — LORAZEPAM 0.5 MG: 2 INJECTION, SOLUTION INTRAMUSCULAR; INTRAVENOUS at 21:59

## 2022-08-30 RX ADMIN — FAMOTIDINE 20 MG: 20 TABLET ORAL at 10:40

## 2022-08-30 RX ADMIN — HEPARIN SODIUM 5000 UNITS: 5000 INJECTION INTRAVENOUS; SUBCUTANEOUS at 13:38

## 2022-08-30 RX ADMIN — POTASSIUM CHLORIDE 40 MEQ: 1500 TABLET, EXTENDED RELEASE ORAL at 07:34

## 2022-08-30 RX ADMIN — FAMOTIDINE 20 MG: 20 TABLET ORAL at 17:22

## 2022-08-30 RX ADMIN — ASPIRIN 325 MG ORAL TABLET 325 MG: 325 PILL ORAL at 08:23

## 2022-08-30 RX ADMIN — GABAPENTIN 300 MG: 300 CAPSULE ORAL at 21:58

## 2022-08-30 RX ADMIN — Medication 5 MG: at 21:58
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
## 2022-08-30 NOTE — PROGRESS NOTES
Problem: Falls - Risk of  Goal: *Absence of Falls  Description: Document Yuan Sol Fall Risk and appropriate interventions in the flowsheet.   Outcome: Progressing Towards Goal  Note: Fall Risk Interventions:  Mobility Interventions: Bed/chair exit alarm, Communicate number of staff needed for ambulation/transfer, Patient to call before getting OOB    Mentation Interventions: Adequate sleep, hydration, pain control, Bed/chair exit alarm, Door open when patient unattended, Family/sitter at bedside, Room close to nurse's station, Toileting rounds, Update white board    Medication Interventions: Bed/chair exit alarm, Patient to call before getting OOB, Teach patient to arise slowly         History of Falls Interventions: Bed/chair exit alarm, Door open when patient unattended, Room close to nurse's station         Problem: Patient Education: Go to Patient Education Activity  Goal: Patient/Family Education  Outcome: Progressing Towards Goal     Problem: Patient Education: Go to Patient Education Activity  Goal: Patient/Family Education  Outcome: Progressing Towards Goal     Problem: TIA/CVA Stroke: 0-24 hours  Goal: Off Pathway (Use only if patient is Off Pathway)  Outcome: Progressing Towards Goal  Goal: Activity/Safety  Outcome: Progressing Towards Goal  Goal: Consults, if ordered  Outcome: Progressing Towards Goal  Goal: Diagnostic Test/Procedures  Outcome: Progressing Towards Goal  Goal: Nutrition/Diet  Outcome: Progressing Towards Goal  Goal: Discharge Planning  Outcome: Progressing Towards Goal  Goal: Medications  Outcome: Progressing Towards Goal  Goal: Respiratory  Outcome: Progressing Towards Goal  Goal: Treatments/Interventions/Procedures  Outcome: Progressing Towards Goal  Goal: Minimize risk of bleeding post-thrombolytic infusion  Outcome: Progressing Towards Goal  Goal: Monitor for complications post-thrombolytic infusion  Outcome: Progressing Towards Goal  Goal: Psychosocial  Outcome: Progressing Towards Goal  Goal: *Hemodynamically stable  Outcome: Progressing Towards Goal  Goal: *Neurologically stable  Description: Absence of additional neurological deficits    Outcome: Progressing Towards Goal  Goal: *Verbalizes anxiety and depression are reduced or absent  Outcome: Progressing Towards Goal  Goal: *Absence of Signs of Aspiration on Current Diet  Outcome: Progressing Towards Goal  Goal: *Absence of deep venous thrombosis signs and symptoms(Stroke Metric)  Outcome: Progressing Towards Goal  Goal: *Ability to perform ADLs and demonstrates progressive mobility and function  Outcome: Progressing Towards Goal  Goal: *Stroke education started(Stroke Metric)  Outcome: Progressing Towards Goal  Goal: *Dysphagia screen performed(Stroke Metric)  Outcome: Progressing Towards Goal  Goal: *Rehab consulted(Stroke Metric)  Outcome: Progressing Towards Goal     Problem: TIA/CVA Stroke: Day 2 Until Discharge  Goal: Off Pathway (Use only if patient is Off Pathway)  Outcome: Progressing Towards Goal  Goal: Activity/Safety  Outcome: Progressing Towards Goal  Goal: Diagnostic Test/Procedures  Outcome: Progressing Towards Goal  Goal: Nutrition/Diet  Outcome: Progressing Towards Goal  Goal: Discharge Planning  Outcome: Progressing Towards Goal  Goal: Medications  Outcome: Progressing Towards Goal  Goal: Respiratory  Outcome: Progressing Towards Goal  Goal: Treatments/Interventions/Procedures  Outcome: Progressing Towards Goal  Goal: Psychosocial  Outcome: Progressing Towards Goal  Goal: *Verbalizes anxiety and depression are reduced or absent  Outcome: Progressing Towards Goal  Goal: *Absence of aspiration  Outcome: Progressing Towards Goal  Goal: *Absence of deep venous thrombosis signs and symptoms(Stroke Metric)  Outcome: Progressing Towards Goal  Goal: *Optimal pain control at patient's stated goal  Outcome: Progressing Towards Goal  Goal: *Tolerating diet  Outcome: Progressing Towards Goal  Goal: *Ability to perform ADLs and demonstrates progressive mobility and function  Outcome: Progressing Towards Goal  Goal: *Stroke education continued(Stroke Metric)  Outcome: Progressing Towards Goal     Problem: Ischemic Stroke: Discharge Outcomes  Goal: *Verbalizes anxiety and depression are reduced or absent  Outcome: Progressing Towards Goal  Goal: *Verbalize understanding of risk factor modification(Stroke Metric)  Outcome: Progressing Towards Goal  Goal: *Hemodynamically stable  Outcome: Progressing Towards Goal  Goal: *Absence of aspiration pneumonia  Outcome: Progressing Towards Goal  Goal: *Aware of needed dietary changes  Outcome: Progressing Towards Goal  Goal: *Verbalize understanding of prescribed medications including anti-coagulants, anti-lipid, and/or anti-platelets(Stroke Metric)  Outcome: Progressing Towards Goal  Goal: *Tolerating diet  Outcome: Progressing Towards Goal  Goal: *Aware of follow-up diagnostics related to anticoagulants  Outcome: Progressing Towards Goal  Goal: *Ability to perform ADLs and demonstrates progressive mobility and function  Outcome: Progressing Towards Goal  Goal: *Absence of DVT(Stroke Metric)  Outcome: Progressing Towards Goal  Goal: *Absence of aspiration  Outcome: Progressing Towards Goal  Goal: *Optimal pain control at patient's stated goal  Outcome: Progressing Towards Goal  Goal: *Home safety concerns addressed  Outcome: Progressing Towards Goal  Goal: *Describes available resources and support systems  Outcome: Progressing Towards Goal  Goal: *Verbalizes understanding of activation of EMS(911) for stroke symptoms(Stroke Metric)  Outcome: Progressing Towards Goal  Goal: *Understands and describes signs and symptoms to report to providers(Stroke Metric)  Outcome: Progressing Towards Goal  Goal: *Neurolgocially stable (absence of additional neurological deficits)  Outcome: Progressing Towards Goal  Goal: *Verbalizes importance of follow-up with primary care physician(Stroke Metric)  Outcome: Progressing Towards Goal  Goal: *Smoking cessation discussed,if applicable(Stroke Metric)  Outcome: Progressing Towards Goal  Goal: *Depression screening completed(Stroke Metric)  Outcome: Progressing Towards Goal     Problem: Injury - Risk of, Adverse Drug Event  Goal: *Absence of adverse drug events  Outcome: Progressing Towards Goal  Goal: *Absence of medication errors  Outcome: Progressing Towards Goal  Goal: *Knowledge of prescribed medications  Outcome: Progressing Towards Goal     Problem: Patient Education: Go to Patient Education Activity  Goal: Patient/Family Education  Outcome: Progressing Towards Goal     Problem: Pain  Goal: *Control of Pain  Outcome: Progressing Towards Goal  Goal: *PALLIATIVE CARE:  Alleviation of Pain  Outcome: Progressing Towards Goal

## 2022-08-30 NOTE — ED NOTES
Report called to Chong Katz RN and questions answered. Clarified with Dr. Samson Davis that patient is not being admitted for acute stroke. [facility] called.

## 2022-08-30 NOTE — PROGRESS NOTES
Comprehensive Nutrition Assessment    Type and Reason for Visit: Initial, Positive nutrition screen    Nutrition Recommendations/Plan:   Add oral nutrition supplement to optimize nutrition intake opportunity: Ensure Enlive (each provides 350 kcal, 20g protein) once a day  (Strawberry or Vanilla)   Add oral nutrition supplement to optimize nutrition intake opportunity: Magic Cup (each provides 290 kcal, 9g protein) once a day (Mixed Berry or Vanilla)    Plan to add MVI and thiamine daily   Monitor PO intake, compliance of oral supplement, weight, labs, and plan of care during admission. Malnutrition Assessment:  Malnutrition Status: At risk for malnutrition (specify) (r/t poor PO intake PTA) (08/30/22 1203)      Nutrition History and Allergies:   Past medical history: anxiety, depression, denzodiazepine dependence, BPH, constipation, diverticula of colon, ED, GERD, HTN, Hypopotassemia, knee pain, lower urinary tract symptoms, N/V, peyronie's disease, prostate disease. NKFA. Weight history per chart review:  CBW: 08/29/22 : 79.4 kg (175 lb), 60 days: 06/20/22 : 77.7 kg (171 lb 3.2 oz), 180 days: 02/04/22 : 77.1 kg (170 lb), < 1 year: 11/12/21 : 76.2 kg (168 lb). Weight stable x < 1 year,    Nutrition Assessment:    Visited pt in room, laying in bed, initially sleep and unable to communicate. Patient's spouse was in the room and able to provide information for assessment. Patient's wife reported they went on a cruise on Aug 20th and pt was unable to eat for 8 days and experienced n/v. Patient's wife also reported pt had altered mental status after returning from the cruise but back at baseline currently. Per chart review, pt admitted for concern of TIA/stroke, leg weakness, slurred speech, n/v, decreased appetite. Pt woke up later during the visit, and reported tolerating current diet (CL) and denies n/v. Pt seen by SLP today and transition to REGULAR diet and receptive to adding oral supplements.  Patient's wife reported colonoscopy scheduled for Thursday. PTA pt a picky eater and take a MVI plus B12 per wife's recall. Current lab shows low Potassium (3.3) and Calcium (7.9); both shows improvement. Obtained current body weight from bed scale: 77.7 kg. UBW: 79 kg. Nutrition Related Findings:    Last BM PTA. Output: 325mL (urine). Pertinent Medications: amlodipine, potassium chloride, pepcid, calcium carbonate, lipitor, promethazine. Wound Type: None    Current Nutrition Intake & Therapies:  Average Meal Intake: 51-75%  Average Supplement Intake: None ordered  ADULT DIET Regular    Anthropometric Measures:  Height: 5' 7\" (170.2 cm)  Ideal Body Weight (IBW): 148 lbs (67 kg)  Admission Body Weight: 175 lb 0.7 oz  Current Body Wt:  77.7 kg (171 lb 4.8 oz), 115.7 % IBW. Bed scale  Current BMI (kg/m2): 26.8  Usual Body Weight: 79.4 kg (175 lb)  % Weight Change (Calculated): -2.1  BMI Category: Overweight (BMI 25.0-29. 9)    Estimated Daily Nutrient Needs:  Energy Requirements Based On: Formula (MSJ x 1.2-1.4)  Weight Used for Energy Requirements: Current  Energy (kcal/day): 6448-5985  Weight Used for Protein Requirements: Current (0.8-1.0)  Protein (g/day): 62-78  Method Used for Fluid Requirements: 1 ml/kcal  Fluid (ml/day): 4346-8132    Nutrition Diagnosis:   Inadequate oral intake related to altered GI function, early satiety as evidenced by poor intake prior to admission, GI abnormality, nausea, vomiting    Nutrition Interventions:   Food and/or Nutrient Delivery: Continue current diet, Vitamin supplement, Mineral supplement, Start oral nutrition supplement  Nutrition Education/Counseling: Education not indicated, No recommendations at this time  Coordination of Nutrition Care: Continue to monitor while inpatient  Plan of Care discussed with: patient and patient's wife    Goals:     Goals: Meet at least 75% of estimated needs, by next RD assessment       Nutrition Monitoring and Evaluation:   Behavioral-Environmental Outcomes: None identified  Food/Nutrient Intake Outcomes: Diet advancement/tolerance, Food and nutrient intake, Supplement intake, Vitamin/mineral intake  Physical Signs/Symptoms Outcomes: Biochemical data, GI status, Meal time behavior, Hemodynamic status, Weight, Nutrition focused physical findings    Discharge Planning:    Continue oral nutrition supplement, Continue current diet    Amedeo Ganser, MA, RDN, LD   Contact: [phone]
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
## 2022-08-30 NOTE — REHAB NOTE
ARU/IPR REFERRAL CONTACT NOTE  [id number] [place] [address] for Physical Rehabilitation      Thank you for the opportunity to review this patient's case for admission to [address] [facility] for Physical Rehabilitation. Based on our pre-admission screening:     [ x] This patient does not meet criteria for admission to [facility] for [facility] due to:    [x ] Too functional, per documentation, patient does not require both Physical and Occupational Therapy Services at an acute rehabilitation level of intensity. PT/OT evaluated and discharged patient. Again, Thank you for this referral. Should you have any questions please do not hesitate to call.      Sincerely,  Yolanda Villanueva Liaison  [facility] for [facility]  [phone]

## 2022-08-30 NOTE — ROUTINE PROCESS
TRANSFER - IN REPORT:    Verbal report received from [facility]) on Femi Ashton  being received from [facility] ED(unit) for routine progression of care      Report consisted of patients Situation, Background, Assessment and   Recommendations(SBAR). Information from the following report(s) SBAR, Kardex, and MAR was reviewed with the receiving nurse. Opportunity for questions and clarification was provided. Assessment completed upon patients arrival to unit and care assumed. Pt arrived from [facility] in good condition. Skin assessed with Leonie Hudson RN, skin in good conditinon; pt has tattoos. 2136 - Notified Dr. Parminder Bermeo patient has arrived from [facility]. Updated Dr. Parminder Bermeo on patient BP of 194/94.      0034 - Hydralazine given for BP of 217/104. 0240 - BP reasessed; BP was 218/114.    3535 - Dr. Parminder Bermeo placing order for amlodipine. Gave bedside report to [facility], report included SBAR, MAR, and Kardex. Gave bedside report to [facility], report included SBAR, MAR, and Kardex.
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
## 2022-08-30 NOTE — PROGRESS NOTES
0700: Bedside shift change report given to Teresa Parks RN (oncoming nurse) by Fernanda Choi RN (offgoing nurse). Report included the following information SBAR, Kardex, Intake/Output, MAR, Recent Results, Med Rec Status, and Cardiac Rhythm NSR . Wound Prevention Checklist    Patient: Danuta Kearns (11 y.o. male)  Date: 8/30/2022  Diagnosis: TIA (transient ischemic attack) [G45.9]  LEYLA (acute kidney injury) ([facility] [address] [N17.9]  Hypokalemia [E87.6]  Hypotension [I95.9] <principal problem not specified>    Precautions:         []  Heel prevention boots placed on patient    []  Patient turned q2h during shift    []  Lift team ordered    [x]  Patient on [place] bed/Specialty bed    []  Each Wound is documented during shift (Stage, Color, drainage, odor, measurements, and dressings)    [x]  Dual skin checks done at bedside during shift report with MELE Hilario [facility]     1900: Bedside shift change report given to Fernanda Choi RN (oncoming nurse) by Teresa Parks RN (offgoing nurse). Report included the following information SBAR, Kardex, Intake/Output, MAR, Med Rec Status, and Cardiac Rhythm NSR .

## 2022-08-30 NOTE — PROGRESS NOTES
conducted an initial consultation and Spiritual Assessment for Ash Jones, who is a 57 y. o.,male. Patients Primary Language is: [place]. According to the patients EMR Advent Affiliation is: Synagogue.     The reason the Patient came to the hospital is:   Patient Active Problem List    Diagnosis Date Noted    TIA (transient ischemic attack) 08/29/2022    Hypokalemia 08/29/2022    Hypotension 08/29/2022    LEYLA (acute kidney injury) ([facility] [address] 08/29/2022    Complete rotator cuff tear or rupture of right shoulder, not specified as traumatic 03/29/2021    Backache 04/16/2019    Myalgia and myositis, unspecified 04/16/2019    ERRONEOUS ENCOUNTER--DISREGARD 04/16/2019    Anxiety and depression     Arthritis     BPH (benign prostatic hyperplasia)     Constipation     Diverticula of colon     Erectile dysfunction due to diseases classified elsewhere     GERD (gastroesophageal reflux disease)     Hypopotassemia     Lower urinary tract symptoms (LUTS)     Peyronie's disease     Prostate disease     Wound, open, finger 01/30/2017    Lipid disorder 09/01/2016    Psychiatric disorder     Gastrointestinal disorder     Hypertension     Ventral hernia 07/11/2014    Diverticulitis of colon (without mention of hemorrhage)(562.11) 11/30/2013    Abdominal pain 11/30/2013    Nausea and vomiting 11/30/2013    Elevated blood pressure 11/30/2013    Right bundle branch block 11/30/2013    Tobacco abuse 09/29/2011        The  provided the following Interventions:  Initiated a relationship of care and support. Explored issues of mary, belief, spirituality and Druze/ritual needs while hospitalized. Listened empathically. Provided information about Spiritual Care Services. Offered prayer and assurance of continued prayers on patient's behalf. Chart reviewed. The following outcomes where achieved:  Patient is not interested at this time in completing an [employer] Directive.  Left AMD form for him to look over. Patient shared limited information about both their medical narrative and spiritual journey/beliefs.  confirmed Patient's Religion Affiliation. Patient expressed gratitude for 's visit. Assessment:  Patient's mary in God is very important for him to cope. Patient does not have any Yazdanism/cultural needs that will affect patients preferences in health care. There are no spiritual or Yazdanism issues which require intervention at this time. Plan:  Chaplains will continue to follow and will provide pastoral care on an as needed/requested basis.  recommends bedside caregivers page  on duty if patient shows signs of acute spiritual or emotional distress.     [address]  [phone])
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
## 2022-08-30 NOTE — PROGRESS NOTES
[facility] Hospitalist Group  Progress Note    Patient: Kvng Bolivar Age: 57 y.o. : 1960 MR#: [id number] SSN: [id number]  Date/Time: 2022     Subjective:     Patient seen and evaluated, lying in bed, no acute distress. 62-year-old male presents to the emergency room with concerns for TIA with leg weakness and slurred speech. Work-up for TIA was noted to be negative. Patient symptoms have resolved at this time. Patient noted to have UTI secondary to an abnormal UA. Urine culture sent. Patient also complaining of left flank pain, will order CT abdomen pelvis. Patient is currently on IV Rocephin, ID consulted. Assessment/Plan:     TIA-MRI negative for acute stroke, symptoms have resolved. UTI-IV antibiotics, ID consulted, CT abdomen pelvis to evaluate for possible pyelonephritis. Hypokalemia-repleting  History of hypertension-resume home medications, continue to monitor blood pressure. DVT prophylaxis-Heparin  Full code                Patience Damon MD  22      Case discussed with:  [x]Patient  []Family  []Nursing  []Case Management  DVT Prophylaxis:  []Lovenox  [x]Hep SQ  []SCDs  []Coumadin   []On Heparin gtt    Objective:   VS: Visit Vitals  BP (!) 164/86   Pulse 90   Temp 97.9 °F (36.6 °C)   Resp 20   Ht 5' 7\" (1.702 m)   Wt 79.4 kg (175 lb)   SpO2 97%   BMI 27.41 kg/m²      Tmax/24hrs: Temp (24hrs), Av °F (36.7 °C), Min:97.5 °F (36.4 °C), Max:98.6 °F (37 °C)  IOBRIEF  Intake/Output Summary (Last 24 hours) at 2022 1448  Last data filed at 2022 0600  Gross per 24 hour   Intake 2100 ml   Output 1565 ml   Net 535 ml       General:  Alert, cooperative, no acute distress    Pulmonary:  CTA Bilaterally. No Wheezing/Rhonchi/Rales. Cardiovascular: Regular rate and Rhythm. GI:  Soft, Non distended, Non tender. + Bowel sounds. Extremities:  No edema, cyanosis, clubbing. No calf tenderness. Neurologic: Alert and oriented X 3.  No acute neuro deficits. Additional:    Medications:   Current Facility-Administered Medications   Medication Dose Route Frequency    amLODIPine (NORVASC) tablet 10 mg  10 mg Oral DAILY    famotidine (PEPCID) tablet 20 mg  20 mg Oral BID    [START ON 8/31/2022] therapeutic multivitamin (THERAGRAN) tablet 1 Tablet  1 Tablet Oral DAILY    [START ON 8/31/2022] thiamine HCL (B-1) tablet 100 mg  100 mg Oral DAILY    0.9% NaCl bacteriostatic (NORMAL SALINE) 0.9 % injection 10 mL  10 mL IntraVENous CARD ONCE    0.9% sodium chloride infusion  125 mL/hr IntraVENous CONTINUOUS    aspirin tablet 325 mg  325 mg Oral DAILY    calcium carbonate (TUMS) chewable tablet 200 mg [elemental]  200 mg Oral TID WITH MEALS    atorvastatin (LIPITOR) tablet 80 mg  80 mg Oral QHS    heparin (porcine) injection 5,000 Units  5,000 Units SubCUTAneous Q8H    gabapentin (NEURONTIN) capsule 300 mg  300 mg Oral QHS    LORazepam (ATIVAN) tablet 1 mg  1 mg Oral DAILY    nortriptyline (PAMELOR) capsule 25 mg  25 mg Oral QHS    promethazine (PHENERGAN) tablet 25 mg  25 mg Oral Q4H PRN    LORazepam (ATIVAN) 2 mg/mL injection 0.5 mg  0.5 mg IntraVENous Q6H PRN    hydrALAZINE (APRESOLINE) 20 mg/mL injection 10 mg  10 mg IntraVENous Q6H PRN    cefTRIAXone (ROCEPHIN) 1 g in sterile water (preservative free) 10 mL IV syringe  1 g IntraVENous Q24H    melatonin (rapid dissolve) tablet 5 mg  5 mg Oral QHS PRN       Imaging:   XR Results (most recent):  Results from Hospital Encounter encounter on 03/22/16    XR ABD ACUTE W 1 V CHEST    Narrative  Clinical history: Periumbilical pain    EXAMINATION:  Single view of the chest, supine and upright projections of the abdomen    Correlation: 1/1/2016    FINDINGS:  Trachea and heart size are within normal limits. Lungs are clear. There is no  free air beneath the diaphragm. No air-fluid levels are appreciated. Air is  present scattered throughout the colon. No dilated loops of small or large  bowel.  Phleboliths are seen in the pelvis. Degenerative changes of the spine. Impression  :  1. No acute primary process. 2. Nonspecific bowel gas pattern. CT Results (most recent):  Results from Hospital Encounter encounter on 08/29/22    CT HEAD WO CONT    Narrative  EXAM: CT HEAD WO CONT    INDICATION: fall, ams    COMPARISON: None. TECHNIQUE: Unenhanced CT of the head was performed using 5 mm images. Brain and  bone windows were generated. CT dose reduction was achieved through use of a  standardized protocol tailored for this examination and automatic exposure  control for dose modulation. FINDINGS:  The ventricles and sulci are normal in size, shape and configuration and  midline. . There is no intracranial hemorrhage, extra-axial collection, mass,  mass effect or midline shift. The basilar cisterns are open. No acute infarct  is identified. Remote infarct right cerebellum. The bone windows demonstrate no  abnormalities. The visualized portions of the paranasal sinuses and mastoid air  cells are clear. Impression  No acute findings. Remote right cerebellar infarct. MRI Results (most recent):  Results from [place] encounter on 08/29/22    MRI BRAIN WO CONT    Narrative  EXAM: MRI BRAIN WO CONT    CLINICAL INDICATION/HISTORY: Persistent nausea vomiting anxiety lower extremity  weakness symptoms suggest orthostatic hypotension    TECHNIQUE: Multisequence multiplanar MR imaging acquired through the brain. Contrast used: None    COMPARISON: Head CT August 29, 2022    FINDINGS:    Parenchyma: Cerebellum demonstrates an old watershed infarction involving the  right hemisphere along its more superior aspect    Defect in the axial plane measuring 18 x 14 mm in the sagittal plane 14 mm    Today's diffusion images demonstrate no evidence of an acute infarction    Some minimal scattered gliotic changes in the hemispheres identified nonspecific    No evidence of a bleed    No mass lesion.     CSF spaces: Ventricles and cisterns remain midline in position    IAC regions: Unremarkable    Parasellar region: Unremarkable    Vasculature: Appropriate flow voids within the major skull base vasculature. 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    No evidence of acute infarction bleed or mass identified        Labs:    Recent Results (from the past 48 hour(s))   CBC WITH AUTOMATED DIFF    Collection Time: 08/29/22  1:45 PM   Result Value Ref Range    WBC 12.0 4.6 - 13.2 K/uL    RBC 3.81 (L) 4.35 - 5.65 M/uL    HGB 11.1 (L) 13.0 - 16.0 g/dL    HCT 33.3 (L) 36.0 - 48.0 %    MCV 87.4 78.0 - 100.0 FL    MCH 29.1 24.0 - 34.0 PG    MCHC 33.3 31.0 - 37.0 g/dL    RDW 13.6 11.6 - 14.5 %    PLATELET 648 (H) 647 - 420 K/uL    MPV 9.8 9.2 - 11.8 FL    NRBC 0.0 0  WBC    ABSOLUTE NRBC 0.00 0.00 - 0.01 K/uL    NEUTROPHILS 82 (H) 40 - 73 %    LYMPHOCYTES 10 (L) 21 - 52 %    MONOCYTES 5 3 - 10 %    EOSINOPHILS 2 0 - 5 %    BASOPHILS 0 0 - 2 %    IMMATURE GRANULOCYTES 1 (H) 0.0 - 0.5 %    ABS. NEUTROPHILS 9.8 (H) 1.8 - 8.0 K/UL    ABS. LYMPHOCYTES 1.2 0.9 - 3.6 K/UL    ABS. MONOCYTES 0.7 0.05 - 1.2 K/UL    ABS. EOSINOPHILS 0.3 0.0 - 0.4 K/UL    ABS. BASOPHILS 0.1 0.0 - 0.1 K/UL    ABS. IMM.  GRANS. 0.1 (H) 0.00 - 0.04 K/UL    DF AUTOMATED     METABOLIC PANEL, COMPREHENSIVE    Collection Time: 08/29/22  1:45 PM   Result Value Ref Range    Sodium 137 136 - 145 mmol/L    Potassium 2.5 (LL) 3.5 - 5.5 mmol/L    Chloride 97 (L) 100 - 111 mmol/L    CO2 36 (H) 21 - 32 mmol/L    Anion gap 4 3.0 - 18 mmol/L    Glucose 135 (H) 74 - 99 mg/dL    BUN 15 7.0 - 18 MG/DL    Creatinine 2.16 (H) 0.6 - 1.3 MG/DL    BUN/Creatinine ratio 7 (L) 12 - 20      GFR est AA 38 (L) >60 ml/min/1.73m2    GFR est non-AA 31 (L) >60 ml/min/1.73m2    Calcium 8.9 8.5 - 10.1 MG/DL    Bilirubin, total 0.3 0.2 - 1.0 MG/DL    ALT (SGPT) 28 16 - 61 U/L    AST (SGOT) 15 10 - 38 U/L    Alk.  phosphatase 85 45 - 117 U/L    Protein, total 6.5 6.4 - 8.2 g/dL    Albumin 2.9 (L) 3.4 - 5.0 g/dL    Globulin 3.6 2.0 - 4.0 g/dL    A-G Ratio 0.8 0.8 - 1.7     MAGNESIUM    Collection Time: 08/29/22  1:45 PM   Result Value Ref Range    Magnesium 1.8 1.6 - 2.6 mg/dL   GLUCOSE, POC    Collection Time: 08/29/22  1:46 PM   Result Value Ref Range    Glucose (POC) 133 (H) 70 - 110 mg/dL   EKG, 12 LEAD, INITIAL    Collection Time: 08/29/22  2:41 PM   Result Value Ref Range    Ventricular Rate 72 BPM    Atrial Rate 72 BPM    P-R Interval 144 ms    QRS Duration 178 ms    Q-T Interval 478 ms    QTC Calculation (Bezet) 523 ms    Calculated P Axis 59 degrees    Calculated R Axis -26 degrees    Calculated T Axis 16 degrees    Diagnosis       Normal sinus rhythm  Right bundle branch block  Abnormal ECG  No previous ECGs available  Confirmed by Jorge Luis Farooq MD, [facility] ([id number]) on 8/29/2022 4:04:43 PM     TROPONIN-HIGH SENSITIVITY    Collection Time: 08/29/22  3:00 PM   Result Value Ref Range    Troponin-High Sensitivity 46 0 - 78 ng/L   URINALYSIS W/ RFLX MICROSCOPIC    Collection Time: 08/29/22  6:00 PM   Result Value Ref Range    Color DARK YELLOW      Appearance TURBID      Specific gravity 1.022 1.005 - 1.030      pH (UA) 5.5 5.0 - 8.0      Protein 300 (A) NEG mg/dL    Glucose Negative NEG mg/dL    Ketone TRACE (A) NEG mg/dL    Bilirubin Negative NEG      Blood LARGE (A) NEG      Urobilinogen 1.0 0.2 - 1.0 EU/dL    Nitrites Negative NEG      Leukocyte Esterase LARGE (A) NEG     URINE MICROSCOPIC ONLY    Collection Time: 08/29/22  6:00 PM   Result Value Ref Range    WBC TOO NUMEROUS TO COUNT 0 - 4 /hpf    RBC 21 to 35 0 - 5 /hpf    Epithelial cells Negative 0 - 5 /lpf    Bacteria 4+ (A) NEG /hpf   METABOLIC PANEL, BASIC    Collection Time: 08/29/22  6:05 PM   Result Value Ref Range    Sodium 138 136 - 145 mmol/L    Potassium 2.7 (LL) 3.5 - 5.5 mmol/L    Chloride 101 100 - 111 mmol/L    CO2 33 (H) 21 - 32 mmol/L Anion gap 4 3.0 - 18 mmol/L    Glucose 127 (H) 74 - 99 mg/dL    BUN 16 7.0 - 18 MG/DL    Creatinine 1.80 (H) 0.6 - 1.3 MG/DL    BUN/Creatinine ratio 9 (L) 12 - 20      GFR est AA 47 (L) >60 ml/min/1.73m2    GFR est non-AA 38 (L) >60 ml/min/1.73m2    Calcium 7.7 (L) 8.5 - 10.1 MG/DL   LIPID PANEL    Collection Time: 08/30/22  1:46 AM   Result Value Ref Range    LIPID PROFILE          Cholesterol, total 166 <200 MG/DL    Triglyceride 114 <150 MG/DL    HDL Cholesterol 32 (L) 40 - 60 MG/DL    LDL, calculated 111.2 (H) 0 - 100 MG/DL    VLDL, calculated 22.8 MG/DL    CHOL/HDL Ratio 5.2 (H) 0 - 5.0     HEMOGLOBIN A1C WITH EAG    Collection Time: 08/30/22  1:46 AM   Result Value Ref Range    Hemoglobin A1c 5.5 4.2 - 5.6 %    Est. average glucose 111 mg/dL   CBC W/O DIFF    Collection Time: 08/30/22  1:46 AM   Result Value Ref Range    WBC 11.8 4.6 - 13.2 K/uL    RBC 3.69 (L) 4.35 - 5.65 M/uL    HGB 10.6 (L) 13.0 - 16.0 g/dL    HCT 32.0 (L) 36.0 - 48.0 %    MCV 86.7 78.0 - 100.0 FL    MCH 28.7 24.0 - 34.0 PG    MCHC 33.1 31.0 - 37.0 g/dL    RDW 13.7 11.6 - 14.5 %    PLATELET 764 (H) 823 - 420 K/uL    MPV 10.4 9.2 - 11.8 FL    NRBC 0.0 0  WBC    ABSOLUTE NRBC 0.00 0.00 - 5.87 K/uL   METABOLIC PANEL, BASIC    Collection Time: 08/30/22  1:46 AM   Result Value Ref Range    Sodium 142 136 - 145 mmol/L    Potassium 3.3 (L) 3.5 - 5.5 mmol/L    Chloride 105 100 - 111 mmol/L    CO2 29 21 - 32 mmol/L    Anion gap 8 3.0 - 18 mmol/L    Glucose 102 (H) 74 - 99 mg/dL    BUN 13 7.0 - 18 MG/DL    Creatinine 1.17 0.6 - 1.3 MG/DL    BUN/Creatinine ratio 11 (L) 12 - 20      GFR est AA >60 >60 ml/min/1.73m2    GFR est non-AA >60 >60 ml/min/1.73m2    Calcium 7.9 (L) 8.5 - 10.1 MG/DL       Signed By: Anna Marie MD     August 30, 2022      I spent 25 minutes with the patient in face-to-face consultation, of which greater than 50% was spent in counseling and coordination of care as described above    Disclaimer: Sections of this note are dictated using utilizing voice recognition software. Minor typographical errors may be present. If questions arise, please do not hesitate to contact me or call our department.
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
## 2022-08-31 LAB
HCT VFR BLD AUTO: 32.8 % (ref 36–48)
HGB BLD-MCNC: 11.2 G/DL (ref 13–16)

## 2022-08-31 PROCEDURE — 99222 1ST HOSP IP/OBS MODERATE 55: CPT | Performed by: COLON & RECTAL SURGERY

## 2022-08-31 PROCEDURE — 92526 ORAL FUNCTION THERAPY: CPT

## 2022-08-31 PROCEDURE — 74011250636 HC RX REV CODE- 250/636: Performed by: HOSPITALIST

## 2022-08-31 PROCEDURE — 65660000004 HC RM CVT STEPDOWN

## 2022-08-31 PROCEDURE — 74011250637 HC RX REV CODE- 250/637: Performed by: STUDENT IN AN ORGANIZED HEALTH CARE EDUCATION/TRAINING PROGRAM

## 2022-08-31 PROCEDURE — 74011250637 HC RX REV CODE- 250/637: Performed by: INTERNAL MEDICINE

## 2022-08-31 PROCEDURE — 74011250637 HC RX REV CODE- 250/637: Performed by: NURSE PRACTITIONER

## 2022-08-31 PROCEDURE — 77030040830 HC CATH URETH FOL MDII -A

## 2022-08-31 PROCEDURE — 85018 HEMOGLOBIN: CPT

## 2022-08-31 PROCEDURE — 74011000250 HC RX REV CODE- 250: Performed by: NURSE PRACTITIONER

## 2022-08-31 PROCEDURE — 99232 SBSQ HOSP IP/OBS MODERATE 35: CPT | Performed by: HOSPITALIST

## 2022-08-31 PROCEDURE — 74011250636 HC RX REV CODE- 250/636: Performed by: NURSE PRACTITIONER

## 2022-08-31 PROCEDURE — 74011250637 HC RX REV CODE- 250/637: Performed by: EMERGENCY MEDICINE

## 2022-08-31 PROCEDURE — 74011250637 HC RX REV CODE- 250/637: Performed by: HOSPITALIST

## 2022-08-31 PROCEDURE — 2709999900 HC NON-CHARGEABLE SUPPLY

## 2022-08-31 PROCEDURE — 36415 COLL VENOUS BLD VENIPUNCTURE: CPT

## 2022-08-31 RX ORDER — PANTOPRAZOLE SODIUM 40 MG/1
40 TABLET, DELAYED RELEASE ORAL ONCE
Status: COMPLETED | OUTPATIENT
Start: 2022-08-31 | End: 2022-08-31

## 2022-08-31 RX ORDER — OXYCODONE AND ACETAMINOPHEN 5; 325 MG/1; MG/1
1 TABLET ORAL
Status: DISCONTINUED | OUTPATIENT
Start: 2022-08-31 | End: 2022-09-03 | Stop reason: HOSPADM

## 2022-08-31 RX ORDER — OXYCODONE AND ACETAMINOPHEN 5; 325 MG/1; MG/1
1 TABLET ORAL
Status: DISCONTINUED | OUTPATIENT
Start: 2022-08-31 | End: 2022-08-31

## 2022-08-31 RX ORDER — METRONIDAZOLE 500 MG/100ML
500 INJECTION, SOLUTION INTRAVENOUS EVERY 8 HOURS
Status: DISCONTINUED | OUTPATIENT
Start: 2022-08-31 | End: 2022-09-01

## 2022-08-31 RX ADMIN — CALCIUM CARBONATE (ANTACID) CHEW TAB 500 MG 200 MG: 500 CHEW TAB at 08:08

## 2022-08-31 RX ADMIN — THERA TABS 1 TABLET: TAB at 08:50

## 2022-08-31 RX ADMIN — NORTRIPTYLINE HYDROCHLORIDE 25 MG: 25 CAPSULE ORAL at 21:44

## 2022-08-31 RX ADMIN — Medication 100 MG: at 08:50

## 2022-08-31 RX ADMIN — WATER 1 G: 1 INJECTION INTRAMUSCULAR; INTRAVENOUS; SUBCUTANEOUS at 17:15

## 2022-08-31 RX ADMIN — METRONIDAZOLE 500 MG: 500 INJECTION, SOLUTION INTRAVENOUS at 23:13

## 2022-08-31 RX ADMIN — ATORVASTATIN CALCIUM 80 MG: 40 TABLET, FILM COATED ORAL at 21:44

## 2022-08-31 RX ADMIN — PANTOPRAZOLE SODIUM 40 MG: 40 TABLET, DELAYED RELEASE ORAL at 23:13

## 2022-08-31 RX ADMIN — AMLODIPINE BESYLATE 10 MG: 10 TABLET ORAL at 08:09

## 2022-08-31 RX ADMIN — ASPIRIN 325 MG ORAL TABLET 325 MG: 325 PILL ORAL at 08:08

## 2022-08-31 RX ADMIN — OXYCODONE HYDROCHLORIDE AND ACETAMINOPHEN 1 TABLET: 5; 325 TABLET ORAL at 23:13

## 2022-08-31 RX ADMIN — Medication 5 MG: at 22:08

## 2022-08-31 RX ADMIN — GABAPENTIN 300 MG: 300 CAPSULE ORAL at 21:44

## 2022-08-31 RX ADMIN — FAMOTIDINE 20 MG: 20 TABLET ORAL at 17:15

## 2022-08-31 RX ADMIN — OXYCODONE HYDROCHLORIDE AND ACETAMINOPHEN 1 TABLET: 5; 325 TABLET ORAL at 18:38

## 2022-08-31 RX ADMIN — HEPARIN SODIUM 5000 UNITS: 5000 INJECTION INTRAVENOUS; SUBCUTANEOUS at 05:00

## 2022-08-31 RX ADMIN — FAMOTIDINE 20 MG: 20 TABLET ORAL at 08:08

## 2022-08-31 RX ADMIN — HEPARIN SODIUM 5000 UNITS: 5000 INJECTION INTRAVENOUS; SUBCUTANEOUS at 14:41

## 2022-08-31 RX ADMIN — LORAZEPAM 0.5 MG: 2 INJECTION, SOLUTION INTRAMUSCULAR; INTRAVENOUS at 16:06

## 2022-08-31 RX ADMIN — LORAZEPAM 1 MG: 1 TABLET ORAL at 08:08

## 2022-08-31 RX ADMIN — METRONIDAZOLE 500 MG: 500 INJECTION, SOLUTION INTRAVENOUS at 08:09

## 2022-08-31 RX ADMIN — METRONIDAZOLE 500 MG: 500 INJECTION, SOLUTION INTRAVENOUS at 16:06
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
## 2022-08-31 NOTE — PROGRESS NOTES
Reason for Admission:  TIA (transient ischemic attack) [G45.9]  LEYLA (acute kidney injury) ([facility] [address] [N17.9]  Hypokalemia [E87.6]  Hypotension [I95.9]                 RUR Score:    9            Plan for utilizing home health:    no                      Likelihood of Readmission:   LOW                         Transition of Care Plan:              Initial assessment completed with patient. Cognitive status of patient: oriented to time, place, person and situation. Face sheet information confirmed:  yes. The patient designates his wife Amalia Edwards to participate in his discharge plan and to receive any needed information. This patient lives in a home with wife. Patient is able to navigate steps as needed. Prior to hospitalization, patient was considered to be independent with ADLs/IADLS : yes . Patient has a current ACP document on file: no      Healthcare Decision Maker:   Primary Decision Maker: Main Og Spouse - [phone]    Click here to complete [address] including selection of the Healthcare Decision Maker Relationship (ie \"Primary\")    The wife will be available to transport patient home upon discharge. The patient already has Tracee Francis, [address] [employer] chair, medical equipment available in the home. Patient is not currently active with home health. Patient has not stayed in a skilled nursing facility or rehab. Was  stay within last 60 days : no. This patient is on dialysis :no    Currently, the discharge plan is Home. The patient states that he can obtain his medications from the pharmacy, and take his medications as directed. Patient's current insurance is [facility] Management Interventions  PCP Verified by CM: Yes  Palliative Care Criteria Met (RRAT>21 & CHF Dx)?: No  Mode of Transport at Discharge:  Other (see comment) (family)  Transition of Care Consult (CM Consult): Discharge Planning  Physical Therapy Consult: Yes  Occupational Therapy Consult: Yes  Support Systems: Spouse/Significant Other, Child(aleida), Other Family Member(s)  Discharge Location  Patient Expects to be Discharged to[de-identified] Home with family assistance        YOLANDA AcevedoN RN  Care Management  Pager: [phone]

## 2022-08-31 NOTE — PROGRESS NOTES
[facility] Hospitalist Group  Progress Note    Patient: Danuta Kearns Age: 57 y.o. : 1960 MR#: [id number] SSN: [id number]  Date/Time: 2022     Subjective:     Patient seen and evaluated, lying in bed, no acute distress. 79-year-old male presents to the emergency room with concerns for TIA with leg weakness and slurred speech. Work-up for TIA was noted to be negative. Patient symptoms have resolved at this time. Patient noted to have UTI secondary to an abnormal UA. Urine culture sent. Patient also complaining of left flank pain, will order CT abdomen pelvis. Patient is currently on IV Rocephin, ID consulted. -CT abdomen pelvis reviewed, shows diverticular abscess, colorectal surgery consulted, continue broad-spectrum IV antibiotics, ID on board. Patient continues to complain of left flank/lower abdominal pain. Assessment/Plan:     TIA-MRI negative for acute stroke, symptoms have resolved. UTI-IV antibiotics, ID consulted, CT abdomen pelvis to evaluate for possible pyelonephritis. CT abdomen pelvis shows diverticular abscess, colorectal surgery consulted, continue broad-spectrum IV antibiotics, ID on board. Hypokalemia-repleting  History of hypertension-resume home medications, continue to monitor blood pressure. DVT prophylaxis-Heparin  Full code    Discussed with patient, continue to monitor. Called wife at [phone] & updated her.            Marilyn Agosto MD  22      Case discussed with:  [x]Patient  []Family  []Nursing  []Case Management  DVT Prophylaxis:  []Lovenox  [x]Hep SQ  []SCDs  []Coumadin   []On Heparin gtt    Objective:   VS: Visit Vitals  BP (!) 147/80 (BP 1 Location: Left upper arm, BP Patient Position: At rest)   Pulse 77   Temp 97.3 °F (36.3 °C)   Resp 20   Ht 5' 7\" (1.702 m)   Wt 79.4 kg (175 lb)   SpO2 98%   BMI 27.41 kg/m²        Tmax/24hrs: Temp (24hrs), Av.6 °F (36.4 °C), Min:97.3 °F (36.3 °C), Max:98.3 °F (36.8 °C)  IOBRIEF  Intake/Output Summary (Last 24 hours) at 8/31/2022 1457  Last data filed at 8/30/2022 1940  Gross per 24 hour   Intake --   Output 150 ml   Net -150 ml         General:  Alert, cooperative, no acute distress    Pulmonary:  CTA Bilaterally. No Wheezing/Rhonchi/Rales. Cardiovascular: Regular rate and Rhythm. GI:  Soft, Non distended,+ Bowel sounds. LLQ pain   Extremities:  No edema, cyanosis, clubbing. No calf tenderness. Neurologic: Alert and oriented X 3. No acute neuro deficits.   Additional:    Medications:   Current Facility-Administered Medications   Medication Dose Route Frequency    metroNIDAZOLE (FLAGYL) IVPB premix 500 mg  500 mg IntraVENous Q8H    amLODIPine (NORVASC) tablet 10 mg  10 mg Oral DAILY    famotidine (PEPCID) tablet 20 mg  20 mg Oral BID    therapeutic multivitamin (THERAGRAN) tablet 1 Tablet  1 Tablet Oral DAILY    thiamine HCL (B-1) tablet 100 mg  100 mg Oral DAILY    aspirin tablet 325 mg  325 mg Oral DAILY    calcium carbonate (TUMS) chewable tablet 200 mg [elemental]  200 mg Oral TID WITH MEALS    atorvastatin (LIPITOR) tablet 80 mg  80 mg Oral QHS    heparin (porcine) injection 5,000 Units  5,000 Units SubCUTAneous Q8H    gabapentin (NEURONTIN) capsule 300 mg  300 mg Oral QHS    LORazepam (ATIVAN) tablet 1 mg  1 mg Oral DAILY    nortriptyline (PAMELOR) capsule 25 mg  25 mg Oral QHS    promethazine (PHENERGAN) tablet 25 mg  25 mg Oral Q4H PRN    LORazepam (ATIVAN) 2 mg/mL injection 0.5 mg  0.5 mg IntraVENous Q6H PRN    hydrALAZINE (APRESOLINE) 20 mg/mL injection 10 mg  10 mg IntraVENous Q6H PRN    cefTRIAXone (ROCEPHIN) 1 g in sterile water (preservative free) 10 mL IV syringe  1 g IntraVENous Q24H    melatonin (rapid dissolve) tablet 5 mg  5 mg Oral QHS PRN       Imaging:   XR Results (most recent):  Results from Hospital Encounter encounter on 03/22/16    XR ABD ACUTE W 1 V CHEST    Narrative  Clinical history: Periumbilical pain    EXAMINATION:  Single view of the chest, supine and upright projections of the abdomen    Correlation: 1/1/2016    FINDINGS:  Trachea and heart size are within normal limits. Lungs are clear. There is no  free air beneath the diaphragm. No air-fluid levels are appreciated. Air is  present scattered throughout the colon. No dilated loops of small or large  bowel. Phleboliths are seen in the pelvis. Degenerative changes of the spine. Impression  :  1. No acute primary process. 2. Nonspecific bowel gas pattern. CT Results (most recent):  Results from Hospital Encounter encounter on 08/29/22    CT ABD PELV W CONT    Narrative  EXAM: CT ABD PELV W CONT    CLINICAL INDICATION/HISTORY: R/o Pyelo flank pain when urinating    TECHNIQUE: Contiguous axial images were obtained through the abdomen and pelvis. From these, sagittal and coronal reconstructions were generated. Contrast : 100 cc [id number]    CT scans at this facility are performed using dose optimization technique as  appropriate with performed exam, to include automated exposure control,  adjustment of mA and/or kV according to patient's size (including appropriate  matching for site-specific examinations), or use of iterative reconstruction  technique. COMPARISON: None    FINDINGS:  Lower chest: Bilateral lung bases are clear. The base of the heart is within  limits. Liver: No focal lesions identified. Gallbladder/Biliary: No calcified gallstones identified. Spleen: Normal in size and contour. Pancreas: Within normal limits for technique. Kidneys, Urinary Bladder:  Symmetric and nonhydronephrotic. Left lateral bladder  diverticulum. Wall thickening in the superior urinary bladder. Adrenal Glands: Unremarkable    Bowels/Mesentery: Diverticulosis of the sigmoid colon with mild inflammatory  wall thickening of the mid sigmoid colon.  There is a 4.4 x 2.9 x 1.6 cm  collection inferior to the sigmoid colon along superior margin of the urinary  bladder which is of the urinary bladder wall thickening. Small foci of air. Peritoneum/Abdominal Wall: No free air or free fluid identified. Pelvic organs: Unremarkable    Vascular: Aorta unremarkable for age. IVC unremarkable. Lymph Nodes: No adenopathy. Bones: No acute findings. Unremarkable for age. Impression  1. Sigmoid diverticulitis. Small collection interposed between the sigmoid  colon and urinary bladder may represent a diverticular abscess. Bladder wall  thickening along the superior margin adjacent to the collection could be  reactive. 2.  No hydronephrosis or perinephric fat stranding to suggest pyonephritis. 3.  Left lateral bladder diverticulum. MRI Results (most recent):  Results from [place] encounter on 08/29/22    MRI BRAIN WO CONT    Narrative  EXAM: MRI BRAIN WO CONT    CLINICAL INDICATION/HISTORY: Persistent nausea vomiting anxiety lower extremity  weakness symptoms suggest orthostatic hypotension    TECHNIQUE: Multisequence multiplanar MR imaging acquired through the brain. Contrast used: None    COMPARISON: Head CT August 29, 2022    FINDINGS:    Parenchyma: Cerebellum demonstrates an old watershed infarction involving the  right hemisphere along its more superior aspect    Defect in the axial plane measuring 18 x 14 mm in the sagittal plane 14 mm    Today's diffusion images demonstrate no evidence of an acute infarction    Some minimal scattered gliotic changes in the hemispheres identified nonspecific    No evidence of a bleed    No mass lesion. CSF spaces: Ventricles and cisterns remain midline in position    IAC regions: Unremarkable    Parasellar region: Unremarkable    Vasculature: Appropriate flow voids within the major skull base vasculature.     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    No evidence of acute infarction bleed or mass identified        Labs: Recent Results (from the past 48 hour(s))   TROPONIN-HIGH SENSITIVITY    Collection Time: 08/29/22  3:00 PM   Result Value Ref Range    Troponin-High Sensitivity 46 0 - 78 ng/L   URINALYSIS W/ RFLX MICROSCOPIC    Collection Time: 08/29/22  6:00 PM   Result Value Ref Range    Color DARK YELLOW      Appearance TURBID      Specific gravity 1.022 1.005 - 1.030      pH (UA) 5.5 5.0 - 8.0      Protein 300 (A) NEG mg/dL    Glucose Negative NEG mg/dL    Ketone TRACE (A) NEG mg/dL    Bilirubin Negative NEG      Blood LARGE (A) NEG      Urobilinogen 1.0 0.2 - 1.0 EU/dL    Nitrites Negative NEG      Leukocyte Esterase LARGE (A) NEG     URINE MICROSCOPIC ONLY    Collection Time: 08/29/22  6:00 PM   Result Value Ref Range    WBC TOO NUMEROUS TO COUNT 0 - 4 /hpf    RBC 21 to 35 0 - 5 /hpf    Epithelial cells Negative 0 - 5 /lpf    Bacteria 4+ (A) NEG /hpf   METABOLIC PANEL, BASIC    Collection Time: 08/29/22  6:05 PM   Result Value Ref Range    Sodium 138 136 - 145 mmol/L    Potassium 2.7 (LL) 3.5 - 5.5 mmol/L    Chloride 101 100 - 111 mmol/L    CO2 33 (H) 21 - 32 mmol/L    Anion gap 4 3.0 - 18 mmol/L    Glucose 127 (H) 74 - 99 mg/dL    BUN 16 7.0 - 18 MG/DL    Creatinine 1.80 (H) 0.6 - 1.3 MG/DL    BUN/Creatinine ratio 9 (L) 12 - 20      GFR est AA 47 (L) >60 ml/min/1.73m2    GFR est non-AA 38 (L) >60 ml/min/1.73m2    Calcium 7.7 (L) 8.5 - 10.1 MG/DL   LIPID PANEL    Collection Time: 08/30/22  1:46 AM   Result Value Ref Range    LIPID PROFILE          Cholesterol, total 166 <200 MG/DL    Triglyceride 114 <150 MG/DL    HDL Cholesterol 32 (L) 40 - 60 MG/DL    LDL, calculated 111.2 (H) 0 - 100 MG/DL    VLDL, calculated 22.8 MG/DL    CHOL/HDL Ratio 5.2 (H) 0 - 5.0     HEMOGLOBIN A1C WITH EAG    Collection Time: 08/30/22  1:46 AM   Result Value Ref Range    Hemoglobin A1c 5.5 4.2 - 5.6 %    Est. average glucose 111 mg/dL   CBC W/O DIFF    Collection Time: 08/30/22  1:46 AM   Result Value Ref Range    WBC 11.8 4.6 - 13.2 K/uL    RBC 3.69 (L) 4.35 - 5.65 M/uL    HGB 10.6 (L) 13.0 - 16.0 g/dL    HCT 32.0 (L) 36.0 - 48.0 %    MCV 86.7 78.0 - 100.0 FL    MCH 28.7 24.0 - 34.0 PG    MCHC 33.1 31.0 - 37.0 g/dL    RDW 13.7 11.6 - 14.5 %    PLATELET 917 (H) 977 - 420 K/uL    MPV 10.4 9.2 - 11.8 FL    NRBC 0.0 0  WBC    ABSOLUTE NRBC 0.00 0.00 - 9.24 K/uL   METABOLIC PANEL, BASIC    Collection Time: 08/30/22  1:46 AM   Result Value Ref Range    Sodium 142 136 - 145 mmol/L    Potassium 3.3 (L) 3.5 - 5.5 mmol/L    Chloride 105 100 - 111 mmol/L    CO2 29 21 - 32 mmol/L    Anion gap 8 3.0 - 18 mmol/L    Glucose 102 (H) 74 - 99 mg/dL    BUN 13 7.0 - 18 MG/DL    Creatinine 1.17 0.6 - 1.3 MG/DL    BUN/Creatinine ratio 11 (L) 12 - 20      GFR est AA >60 >60 ml/min/1.73m2    GFR est non-AA >60 >60 ml/min/1.73m2    Calcium 7.9 (L) 8.5 - 10.1 MG/DL   ECHO ADULT COMPLETE    Collection Time: 08/30/22  3:25 PM   Result Value Ref Range    IVSd 1.3 (A) 0.6 - 1.0 cm    LVIDd 4.7 4.2 - 5.9 cm    LVIDs 3.7 cm    LVOT Diameter 2.1 cm    LVPWd 1.2 (A) 0.6 - 1.0 cm    LVOT Peak Gradient 6 mmHg    LVOT Mean Gradient 4 mmHg    LVOT SV 82.7 ml    LVOT Peak Velocity 1.2 m/s    LVOT VTI 23.9 cm    LA Volume A/L 70 mL    LA Volume 2C 66 (A) 18 - 58 mL    LA Volume 4C 59 (A) 18 - 58 mL    AV Area by Peak Velocity 1.8 cm2    AV Area by VTI 1.8 cm2    AV Peak Gradient 23 mmHg    AV Mean Gradient 13 mmHg    AV Peak Velocity 2.4 m/s    AV Mean Velocity 1.7 m/s    AV VTI 44.7 cm    MV A Velocity 0.84 m/s    MV E Wave Deceleration Time 273.2 ms    MV E Velocity 0.71 m/s    LV E' Lateral Velocity 10 cm/s    TR Peak Gradient 28 mmHg    TR Max Velocity 2.66 m/s    Ascending Aorta 3.5 cm    Aortic Root 3.3 cm    Fractional Shortening 2D 21 28 - 44 %    LVIDd Index 2.46 cm/m2    LVIDs Index 1.94 cm/m2    LV RWT Ratio 0.51     LV Mass 2D 224.8 (A) 88 - 224 g    LV Mass 2D Index 117.7 (A) 49 - 115 g/m2    MV E/A 0.85     E/E' Lateral 7.10     LA Volume Index A/L 37 16 - 34 mL/m2    LVOT Stroke Volume Index 43.3 mL/m2    LVOT Area 3.5 cm2    LA Volume Index 2C 35 (A) 16 - 34 mL/m2    LA Volume Index 4C 31 16 - 34 mL/m2    Ao Root Index 1.73 cm/m2    Ascending Aorta Index 1.83 cm/m2    AV Velocity Ratio 0.50     LVOT:AV VTI Index 0.53     MARLEN/BSA VTI 0.9 cm2/m2    MARLEN/BSA Peak Velocity 0.9 cm2/m2    E/E' Ratio (Averaged) 9.47     LV E' Septal Velocity 6 cm/s    E/E' Septal 11.83     TAPSE 2.1 1.7 cm       Signed By: Marcus Dickey MD     August 31, 2022      I spent 25 minutes with the patient in face-to-face consultation, of which greater than 50% was spent in counseling and coordination of care as described above    Disclaimer: Sections of this note are dictated using utilizing voice recognition software. Minor typographical errors may be present. If questions arise, please do not hesitate to contact me or call our department.
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
## 2022-08-31 NOTE — PROGRESS NOTES
Problem: Dysphagia (Adult)  Goal: *Acute Goals and Plan of Care (Insert Text)  Description:   Patient will:  1. Tolerate PO trials with 0 s/s overt distress in 4/5 trials  2. Utilize compensatory swallow strategies/maneuvers (decrease bite/sip, size/rate, alt. liq/sol) with min cues in 4/5 trials    Rec:     Reg solid with thin liquids  Aspiration precautions  HOB >45 during po intake, remain >30 for 30-45 minutes after po   Small bites/sips; alternate liquid/solid with slow feeding rate   Oral care TID  Meds per pt preference    Outcome: Progressing Towards Goal   SPEECH LANGUAGE PATHOLOGY DYSPHAGIA TREATMENT    Patient: Kamran Cox (71 y.o. male)  Date: 8/31/2022  Diagnosis: TIA (transient ischemic attack) [G45.9]  LEYLA (acute kidney injury) ([facility] [N17.9]  Hypokalemia [E87.6]  Hypotension [I95.9] <principal problem not specified>      Precautions: aspiration Fall  PLOF: As per H&P      ASSESSMENT:  Pt was seen at bedside for follow up dysphagia management. Pt tolerated reg solid, puree, and thin liquids +/- straw consecutive swallows without any overt s/sx of aspiration. Mastication was appropriate with timely a-p transit. Positive oral clearance observed across all trials. Pt safe for initiation of reg solid diet with thin liquids, aspiration precautions, oral care TID, and meds as tolerated. ST will continue to follow x 1 visit to ensure safety of diet tolerance. Progression toward goals:  [x]         Improving appropriately and progressing toward goals  []         Improving slowly and progressing toward goals  []         Not making progress toward goals and plan of care will be adjusted     PLAN:  Recommendations and Planned Interventions: See above  Patient continues to benefit from skilled intervention to address the above impairments. Continue treatment per established plan of care. SUBJECTIVE:   Patient stated I wish I saw the doctors more.     OBJECTIVE:   Cognitive and Communication Status:  Neurologic State: Alert  Orientation Level: Oriented X4  Cognition: Follows commands  Perception: Appears intact  Perseveration: No perseveration noted  Safety/Judgement: Fall prevention  Dysphagia Treatment:  Oral Assessment:  Oral Assessment  Dentition: Edentulous  Oral Hygiene: Adequate  Lingual: No impairment  Velum: No impairment  Mandible: No impairment  P.O. Trials:   Patient Position: [facility] at [facility]   Vocal quality prior to P.O.: No impairment   Consistency Presented:  Thin liquid, Solid   How Presented: Self-fed/presented, Spoon, Straw   Bolus Acceptance: No impairment   Bolus Formation/Control: No impairment   Propulsion: No impairment   Oral Residue: None   Initiation of Swallow: No impairment   Laryngeal Elevation: Functional   Aspiration Signs/Symptoms: None   Pharyngeal Phase Characteristics: No impairment, issues, or problems    Effective Modifications: None   Cues for Modifications: None       Oral Phase Severity: No impairment   Pharyngeal Phase Severity : No impairment    PAIN:  Start of Tx: 0  End of Tx: 0     After treatment:   []              Patient left in no apparent distress sitting up in chair  [x]              Patient left in no apparent distress in bed  [x]              Call bell left within reach  [x]              Nursing notified  []              Family present  []              Caregiver present  []              Bed alarm activated    COMMUNICATION/EDUCATION:   [x] Aspiration precautions; swallow safety; compensatory techniques  [x]        Patient/family able to participate in training and education   []  Patient unable to participate in training and education, education ongoing with staff   [] Patient understands goals and intent of therapy; neutral about participation     Thank you for this referral.    Sara Collins M.S., CCC-SLP/L  Speech-Language Pathologist
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
## 2022-08-31 NOTE — PROGRESS NOTES
0700: Bedside shift change report given to Emory Stewart RN (oncoming nurse) by Darya Villarreal RN (offgoing nurse). Report included the following information SBAR, Kardex, Intake/Output, MAR, Med Rec Status, and Cardiac Rhythm NSR . Wound Prevention Checklist    Patient: Ana Moffett (28 y.o. male)  Date: 8/31/2022  Diagnosis: TIA (transient ischemic attack) [G45.9]  LEYLA (acute kidney injury) ([facility] [N17.9]  Hypokalemia [E87.6]  Hypotension [I95.9] <principal problem not specified>    Precautions: Fall       []  Heel prevention boots placed on patient    []  Patient turned q2h during shift    []  Lift team ordered    [x]  Patient on Yolie bed/Specialty bed    []  Each Wound is documented during shift (Stage, Color, drainage, odor, measurements, and dressings)    [x]  Dual skin checks done at bedside during shift report with Darya Villarreal, [address]: Coley catheter inserted per order from [facility]. Pt called complaining of pain. Dr. Tariq Bo notified. Emory Stewart Marium     1900: Bedside shift change report given to David Dempsey RN (oncoming nurse) by Emory Stewart RN (offgoing nurse). Report included the following information SBAR, Kardex, Intake/Output, MAR, Med Rec Status, and Cardiac Rhythm NSR .

## 2022-08-31 NOTE — ROUTINE PROCESS
Received bedside report from [facility], report included SBAR, MAR, and Kardex. Gave bedside report to [facility], report included SBAR, MAR, and Kardex.

## 2022-08-31 NOTE — PROGRESS NOTES
Problem: Falls - Risk of  Goal: *Absence of Falls  Description: Document Elpidio Nunez Fall Risk and appropriate interventions in the flowsheet.   8/31/2022 0729 by Elodia Gomes  Outcome: Progressing Towards Goal  Note: Fall Risk Interventions:  Mobility Interventions: Bed/chair exit alarm, Communicate number of staff needed for ambulation/transfer, Patient to call before getting OOB    Mentation Interventions: Adequate sleep, hydration, pain control, Bed/chair exit alarm, Door open when patient unattended, Family/sitter at bedside, Room close to nurse's station, Toileting rounds, Update white board    Medication Interventions: Bed/chair exit alarm, Patient to call before getting OOB, Teach patient to arise slowly         History of Falls Interventions: Bed/chair exit alarm, Door open when patient unattended, Room close to nurse's station      8/30/2022 1806 by Elodia Gomes  Outcome: Progressing Towards Goal  Note: Fall Risk Interventions:  Mobility Interventions: Bed/chair exit alarm, Communicate number of staff needed for ambulation/transfer, Patient to call before getting OOB    Mentation Interventions: Adequate sleep, hydration, pain control, Bed/chair exit alarm, Door open when patient unattended, Family/sitter at bedside, Room close to nurse's station, Toileting rounds, Update white board    Medication Interventions: Bed/chair exit alarm, Patient to call before getting OOB, Teach patient to arise slowly         History of Falls Interventions: Bed/chair exit alarm, Door open when patient unattended, Room close to nurse's station         Problem: Patient Education: Go to Patient Education Activity  Goal: Patient/Family Education  8/31/2022 0729 by Elodia Gomes  Outcome: Progressing Towards Goal  8/30/2022 1806 by Elodia Gomes  Outcome: Progressing Towards Goal     Problem: Patient Education: Go to Patient Education Activity  Goal: Patient/Family Education  8/31/2022 0729 by Elodia Gomes  Outcome: Progressing Towards Goal  8/30/2022 1806 by Juma Sparrow  Outcome: Progressing Towards Goal     Problem: TIA/CVA Stroke: 0-24 hours  Goal: Off Pathway (Use only if patient is Off Pathway)  8/31/2022 0729 by Juma Sparrow  Outcome: Progressing Towards Goal  8/30/2022 1806 by Juma Sparrow  Outcome: Progressing Towards Goal  Goal: Activity/Safety  8/31/2022 0729 by Juma Sparrow  Outcome: Progressing Towards Goal  8/30/2022 1806 by Juma Sparrow  Outcome: Progressing Towards Goal  Goal: Consults, if ordered  8/31/2022 0729 by Juma Sparrow  Outcome: Progressing Towards Goal  8/30/2022 1806 by Juma Sparrow  Outcome: Progressing Towards Goal  Goal: Diagnostic Test/Procedures  8/31/2022 0729 by Juma Sparrow  Outcome: Progressing Towards Goal  8/30/2022 1806 by Juma Sparrow  Outcome: Progressing Towards Goal  Goal: Nutrition/Diet  8/31/2022 0729 by Juma Sparrow  Outcome: Progressing Towards Goal  8/30/2022 1806 by Juma Sparrow  Outcome: Progressing Towards Goal  Goal: Discharge Planning  8/31/2022 0729 by Juma Sparrow  Outcome: Progressing Towards Goal  8/30/2022 1806 by Juma Sparrow  Outcome: Progressing Towards Goal  Goal: Medications  8/31/2022 0729 by Juma Sparrow  Outcome: Progressing Towards Goal  8/30/2022 1806 by Juma Sparrow  Outcome: Progressing Towards Goal  Goal: Respiratory  8/31/2022 0729 by uJma Sparrow  Outcome: Progressing Towards Goal  8/30/2022 1806 by Juma Sparrow  Outcome: Progressing Towards Goal  Goal: Treatments/Interventions/Procedures  8/31/2022 0729 by Juma Sparrow  Outcome: Progressing Towards Goal  8/30/2022 1806 by Juma Sparrow  Outcome: Progressing Towards Goal  Goal: Minimize risk of bleeding post-thrombolytic infusion  8/31/2022 0729 by Juma Sparrow  Outcome: Progressing Towards Goal  8/30/2022 1806 by Juma Sparrow  Outcome: Progressing Towards Goal  Goal: Monitor for complications post-thrombolytic infusion  8/31/2022 0729 by Juma Sparrow  Outcome: Progressing Towards Goal  8/30/2022 1806 by Juma Sparrow  Outcome: Progressing Towards Goal  Goal: Psychosocial  8/31/2022 0729 by Donnald Alla  Outcome: Progressing Towards Goal  8/30/2022 1806 by Donnald Alla  Outcome: Progressing Towards Goal  Goal: *Hemodynamically stable  8/31/2022 0729 by Donnald Alla  Outcome: Progressing Towards Goal  8/30/2022 1806 by Donnald Alla  Outcome: Progressing Towards Goal  Goal: *Neurologically stable  Description: Absence of additional neurological deficits    8/31/2022 0729 by Donnald Alla  Outcome: Progressing Towards Goal  8/30/2022 1806 by Donnald Alla  Outcome: Progressing Towards Goal  Goal: *Verbalizes anxiety and depression are reduced or absent  8/31/2022 0729 by Donnald Alla  Outcome: Progressing Towards Goal  8/30/2022 1806 by Donnald Alla  Outcome: Progressing Towards Goal  Goal: *Absence of Signs of Aspiration on Current Diet  8/31/2022 0729 by Donnald Alla  Outcome: Progressing Towards Goal  8/30/2022 1806 by Donnald Alla  Outcome: Progressing Towards Goal  Goal: *Absence of deep venous thrombosis signs and symptoms(Stroke Metric)  8/31/2022 0729 by Donnald Alla  Outcome: Progressing Towards Goal  8/30/2022 1806 by Donnald Alla  Outcome: Progressing Towards Goal  Goal: *Ability to perform ADLs and demonstrates progressive mobility and function  8/31/2022 0729 by Donnald Alla  Outcome: Progressing Towards Goal  8/30/2022 1806 by Donnald Alla  Outcome: Progressing Towards Goal  Goal: *Stroke education started(Stroke Metric)  8/31/2022 0729 by Donnald Alla  Outcome: Progressing Towards Goal  8/30/2022 1806 by Donnald Alla  Outcome: Progressing Towards Goal  Goal: *Dysphagia screen performed(Stroke Metric)  8/31/2022 0729 by Donnald Alla  Outcome: Progressing Towards Goal  8/30/2022 1806 by Donnald Alla  Outcome: Progressing Towards Goal  Goal: *Rehab consulted(Stroke Metric)  8/31/2022 0729 by Donnald Alla  Outcome: Progressing Towards Goal  8/30/2022 1806 by Donnald Alla  Outcome: Progressing Towards Goal     Problem: TIA/CVA Stroke: Day 2 Until Discharge  Goal: Off Pathway (Use only if patient is Off Pathway)  8/31/2022 0729 by Ruslan Marie  Outcome: Progressing Towards Goal  8/30/2022 1806 by Ruslan Marie  Outcome: Progressing Towards Goal  Goal: Activity/Safety  8/31/2022 0729 by Ruslan Marie  Outcome: Progressing Towards Goal  8/30/2022 1806 by Ruslan Marie  Outcome: Progressing Towards Goal  Goal: Diagnostic Test/Procedures  8/31/2022 0729 by Ruslan Marie  Outcome: Progressing Towards Goal  8/30/2022 1806 by Ruslan Marie  Outcome: Progressing Towards Goal  Goal: Nutrition/Diet  8/31/2022 0729 by Ruslan Marie  Outcome: Progressing Towards Goal  8/30/2022 1806 by Ruslan Marie  Outcome: Progressing Towards Goal  Goal: Discharge Planning  8/31/2022 0729 by Ruslan Marie  Outcome: Progressing Towards Goal  8/30/2022 1806 by Ruslan Marie  Outcome: Progressing Towards Goal  Goal: Medications  8/31/2022 0729 by Ruslan Marie  Outcome: Progressing Towards Goal  8/30/2022 1806 by Ruslan Marie  Outcome: Progressing Towards Goal  Goal: Respiratory  8/31/2022 0729 by Ruslan Marie  Outcome: Progressing Towards Goal  8/30/2022 1806 by Ruslan Marie  Outcome: Progressing Towards Goal  Goal: Treatments/Interventions/Procedures  8/31/2022 0729 by Ruslan Marie  Outcome: Progressing Towards Goal  8/30/2022 1806 by Ruslan Marie  Outcome: Progressing Towards Goal  Goal: Psychosocial  8/31/2022 0729 by Ruslan Marie  Outcome: Progressing Towards Goal  8/30/2022 1806 by Ruslan Marie  Outcome: Progressing Towards Goal  Goal: *Verbalizes anxiety and depression are reduced or absent  8/31/2022 0729 by Ruslan Marie  Outcome: Progressing Towards Goal  8/30/2022 1806 by Ruslan Marie  Outcome: Progressing Towards Goal  Goal: *Absence of aspiration  8/31/2022 0729 by Ruslan Marie  Outcome: Progressing Towards Goal  8/30/2022 1806 by Ruslan Marie  Outcome: Progressing Towards Goal  Goal: *Absence of deep venous thrombosis signs and symptoms(Stroke Metric)  8/31/2022 0729 by Ruslan Marie  Outcome: Progressing Towards Goal  8/30/2022 1806 by Elora Eisenmenger  Outcome: Progressing Towards Goal  Goal: *Optimal pain control at patient's stated goal  8/31/2022 0729 by Elora Eisenmenger  Outcome: Progressing Towards Goal  8/30/2022 1806 by Elora Eisenmenger  Outcome: Progressing Towards Goal  Goal: *Tolerating diet  8/31/2022 0729 by Elora Eisenmenger  Outcome: Progressing Towards Goal  8/30/2022 1806 by Elora Eisenmenger  Outcome: Progressing Towards Goal  Goal: *Ability to perform ADLs and demonstrates progressive mobility and function  8/31/2022 0729 by Elora Eisenmenger  Outcome: Progressing Towards Goal  8/30/2022 1806 by Elora Eisenmenger  Outcome: Progressing Towards Goal  Goal: *Stroke education continued(Stroke Metric)  8/31/2022 0729 by Elora Eisenmenger  Outcome: Progressing Towards Goal  8/30/2022 1806 by Elora Eisenmenger  Outcome: Progressing Towards Goal     Problem: Ischemic Stroke: Discharge Outcomes  Goal: *Verbalizes anxiety and depression are reduced or absent  8/31/2022 0729 by Elora Eisenmenger  Outcome: Progressing Towards Goal  8/30/2022 1806 by Elora Eisenmenger  Outcome: Progressing Towards Goal  Goal: *Verbalize understanding of risk factor modification(Stroke Metric)  8/31/2022 0729 by Elora Eisenmenger  Outcome: Progressing Towards Goal  8/30/2022 1806 by Elora Eisenmenger  Outcome: Progressing Towards Goal  Goal: *Hemodynamically stable  8/31/2022 0729 by Elora Eisenmenger  Outcome: Progressing Towards Goal  8/30/2022 1806 by Elora Eisenmenger  Outcome: Progressing Towards Goal  Goal: *Absence of aspiration pneumonia  8/31/2022 0729 by Elora Eisenmenger  Outcome: Progressing Towards Goal  8/30/2022 1806 by Elora Eisenmenger  Outcome: Progressing Towards Goal  Goal: *Aware of needed dietary changes  8/31/2022 0729 by Elora Eisenmenger  Outcome: Progressing Towards Goal  8/30/2022 1806 by Elora Eisenmenger  Outcome: Progressing Towards Goal  Goal: *Verbalize understanding of prescribed medications including anti-coagulants, anti-lipid, and/or anti-platelets(Stroke Metric)  8/31/2022 0729 by Elora Eisenmenger  Outcome: Progressing Towards Goal  8/30/2022 1806 by Dania Diesel  Outcome: Progressing Towards Goal  Goal: *Tolerating diet  8/31/2022 0729 by Dania Diesel  Outcome: Progressing Towards Goal  8/30/2022 1806 by Dania Diesel  Outcome: Progressing Towards Goal  Goal: *Aware of follow-up diagnostics related to anticoagulants  8/31/2022 0729 by Dania Diesel  Outcome: Progressing Towards Goal  8/30/2022 1806 by Dania Diesel  Outcome: Progressing Towards Goal  Goal: *Ability to perform ADLs and demonstrates progressive mobility and function  8/31/2022 0729 by Dania Diesel  Outcome: Progressing Towards Goal  8/30/2022 1806 by Dania Diesel  Outcome: Progressing Towards Goal  Goal: *Absence of DVT(Stroke Metric)  8/31/2022 0729 by Dania Diesel  Outcome: Progressing Towards Goal  8/30/2022 1806 by Dania Diesel  Outcome: Progressing Towards Goal  Goal: *Absence of aspiration  8/31/2022 0729 by Dania Diesel  Outcome: Progressing Towards Goal  8/30/2022 1806 by Dania Diesel  Outcome: Progressing Towards Goal  Goal: *Optimal pain control at patient's stated goal  8/31/2022 0729 by Dania Diesel  Outcome: Progressing Towards Goal  8/30/2022 1806 by Dania Diesel  Outcome: Progressing Towards Goal  Goal: *Home safety concerns addressed  8/31/2022 0729 by Dania Diesel  Outcome: Progressing Towards Goal  8/30/2022 1806 by Dania Diesel  Outcome: Progressing Towards Goal  Goal: *Describes available resources and support systems  8/31/2022 0729 by Dania Diesel  Outcome: Progressing Towards Goal  8/30/2022 1806 by Dania Diesel  Outcome: Progressing Towards Goal  Goal: *Verbalizes understanding of activation of EMS(911) for stroke symptoms(Stroke Metric)  8/31/2022 0729 by Dania Diesel  Outcome: Progressing Towards Goal  8/30/2022 1806 by Dania Diesel  Outcome: Progressing Towards Goal  Goal: *Understands and describes signs and symptoms to report to providers(Stroke Metric)  8/31/2022 0729 by Dania Diesel  Outcome: Progressing Towards Goal  8/30/2022 1806 by Dania Diesel  Outcome: Progressing Towards Goal  Goal: *Neurolgocially stable (absence of additional neurological deficits)  8/31/2022 0729 by Isaias Rodriguez  Outcome: Progressing Towards Goal  8/30/2022 1806 by Isaias Rodriguez  Outcome: Progressing Towards Goal  Goal: *Verbalizes importance of follow-up with primary care physician(Stroke Metric)  8/31/2022 0729 by Isaias Rodriguez  Outcome: Progressing Towards Goal  8/30/2022 1806 by Isaias Rodriguez  Outcome: Progressing Towards Goal  Goal: *Smoking cessation discussed,if applicable(Stroke Metric)  8/31/2022 0729 by Isaias Rodriguez  Outcome: Progressing Towards Goal  8/30/2022 1806 by Isaias Rodriguez  Outcome: Progressing Towards Goal  Goal: *Depression screening completed(Stroke Metric)  8/31/2022 0729 by Isaias Rodriguez  Outcome: Progressing Towards Goal  8/30/2022 1806 by Isaias Rodriguez  Outcome: Progressing Towards Goal     Problem: Injury - Risk of, Adverse Drug Event  Goal: *Absence of adverse drug events  8/31/2022 0729 by Isaias Rodriugez  Outcome: Progressing Towards Goal  8/30/2022 1806 by Isaias Rodriguez  Outcome: Progressing Towards Goal  Goal: *Absence of medication errors  8/31/2022 0729 by Isaias Rodriguez  Outcome: Progressing Towards Goal  8/30/2022 1806 by Isaias Rodriguez  Outcome: Progressing Towards Goal  Goal: *Knowledge of prescribed medications  8/31/2022 0729 by Isaias Rodriguez  Outcome: Progressing Towards Goal  8/30/2022 1806 by Isaias Rodriguez  Outcome: Progressing Towards Goal     Problem: Patient Education: Go to Patient Education Activity  Goal: Patient/Family Education  8/31/2022 0729 by Isaias Rodriguez  Outcome: Progressing Towards Goal  8/30/2022 1806 by Isaias Rodriguez  Outcome: Progressing Towards Goal     Problem: Pain  Goal: *Control of Pain  8/31/2022 0729 by Isaias Rodriguez  Outcome: Progressing Towards Goal  8/30/2022 1806 by Isaias Rodriguez  Outcome: Progressing Towards Goal  Goal: *PALLIATIVE CARE:  Alleviation of Pain  8/31/2022 0729 by Donnajean Sprain  Outcome: Progressing Towards Goal  8/30/2022 1806 by Marium, Janneth Meng  Outcome: Progressing Towards Goal
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
## 2022-09-01 LAB
ANION GAP SERPL CALC-SCNC: 4 MMOL/L (ref 3–18)
BASOPHILS # BLD: 0.1 K/UL (ref 0–0.1)
BASOPHILS NFR BLD: 1 % (ref 0–2)
BUN SERPL-MCNC: 11 MG/DL (ref 7–18)
BUN/CREAT SERPL: 14 (ref 12–20)
CALCIUM SERPL-MCNC: 8.4 MG/DL (ref 8.5–10.1)
CHLORIDE SERPL-SCNC: 100 MMOL/L (ref 100–111)
CO2 SERPL-SCNC: 35 MMOL/L (ref 21–32)
CREAT SERPL-MCNC: 0.8 MG/DL (ref 0.6–1.3)
DIFFERENTIAL METHOD BLD: ABNORMAL
EOSINOPHIL # BLD: 0.4 K/UL (ref 0–0.4)
EOSINOPHIL NFR BLD: 5 % (ref 0–5)
ERYTHROCYTE [DISTWIDTH] IN BLOOD BY AUTOMATED COUNT: 13.9 % (ref 11.6–14.5)
GLUCOSE SERPL-MCNC: 94 MG/DL (ref 74–99)
HCT VFR BLD AUTO: 32.6 % (ref 36–48)
HGB BLD-MCNC: 10.9 G/DL (ref 13–16)
IMM GRANULOCYTES # BLD AUTO: 0 K/UL (ref 0–0.04)
IMM GRANULOCYTES NFR BLD AUTO: 0 % (ref 0–0.5)
LYMPHOCYTES # BLD: 1 K/UL (ref 0.9–3.6)
LYMPHOCYTES NFR BLD: 12 % (ref 21–52)
MCH RBC QN AUTO: 29.1 PG (ref 24–34)
MCHC RBC AUTO-ENTMCNC: 33.4 G/DL (ref 31–37)
MCV RBC AUTO: 86.9 FL (ref 78–100)
MONOCYTES # BLD: 0.5 K/UL (ref 0.05–1.2)
MONOCYTES NFR BLD: 7 % (ref 3–10)
NEUTS SEG # BLD: 6.1 K/UL (ref 1.8–8)
NEUTS SEG NFR BLD: 76 % (ref 40–73)
NRBC # BLD: 0 K/UL (ref 0–0.01)
NRBC BLD-RTO: 0 PER 100 WBC
PLATELET # BLD AUTO: 363 K/UL (ref 135–420)
PMV BLD AUTO: 9.7 FL (ref 9.2–11.8)
POTASSIUM SERPL-SCNC: 3 MMOL/L (ref 3.5–5.5)
RBC # BLD AUTO: 3.75 M/UL (ref 4.35–5.65)
SODIUM SERPL-SCNC: 139 MMOL/L (ref 136–145)
WBC # BLD AUTO: 8 K/UL (ref 4.6–13.2)

## 2022-09-01 PROCEDURE — 74011250637 HC RX REV CODE- 250/637: Performed by: HOSPITALIST

## 2022-09-01 PROCEDURE — 74011250636 HC RX REV CODE- 250/636: Performed by: HOSPITALIST

## 2022-09-01 PROCEDURE — 92526 ORAL FUNCTION THERAPY: CPT

## 2022-09-01 PROCEDURE — 80048 BASIC METABOLIC PNL TOTAL CA: CPT

## 2022-09-01 PROCEDURE — 74011250637 HC RX REV CODE- 250/637: Performed by: NURSE PRACTITIONER

## 2022-09-01 PROCEDURE — 74011250637 HC RX REV CODE- 250/637: Performed by: EMERGENCY MEDICINE

## 2022-09-01 PROCEDURE — 74011250637 HC RX REV CODE- 250/637: Performed by: INTERNAL MEDICINE

## 2022-09-01 PROCEDURE — 36415 COLL VENOUS BLD VENIPUNCTURE: CPT

## 2022-09-01 PROCEDURE — 99232 SBSQ HOSP IP/OBS MODERATE 35: CPT | Performed by: HOSPITALIST

## 2022-09-01 PROCEDURE — 2709999900 HC NON-CHARGEABLE SUPPLY

## 2022-09-01 PROCEDURE — 77030018842 HC SOL IRR SOD CL 9% BAXT -A

## 2022-09-01 PROCEDURE — 74011250637 HC RX REV CODE- 250/637: Performed by: STUDENT IN AN ORGANIZED HEALTH CARE EDUCATION/TRAINING PROGRAM

## 2022-09-01 PROCEDURE — 51798 US URINE CAPACITY MEASURE: CPT

## 2022-09-01 PROCEDURE — 74011250636 HC RX REV CODE- 250/636: Performed by: NURSE PRACTITIONER

## 2022-09-01 PROCEDURE — 99232 SBSQ HOSP IP/OBS MODERATE 35: CPT | Performed by: COLON & RECTAL SURGERY

## 2022-09-01 PROCEDURE — 85025 COMPLETE CBC W/AUTO DIFF WBC: CPT

## 2022-09-01 PROCEDURE — 65660000004 HC RM CVT STEPDOWN

## 2022-09-01 RX ORDER — CEFUROXIME AXETIL 250 MG/1
500 TABLET ORAL 2 TIMES DAILY
Status: DISCONTINUED | OUTPATIENT
Start: 2022-09-01 | End: 2022-09-02

## 2022-09-01 RX ORDER — TAMSULOSIN HYDROCHLORIDE 0.4 MG/1
0.4 CAPSULE ORAL DAILY
Status: DISCONTINUED | OUTPATIENT
Start: 2022-09-02 | End: 2022-09-03 | Stop reason: HOSPADM

## 2022-09-01 RX ORDER — METRONIDAZOLE 500 MG/1
500 TABLET ORAL 2 TIMES DAILY
Status: DISCONTINUED | OUTPATIENT
Start: 2022-09-01 | End: 2022-09-03 | Stop reason: HOSPADM

## 2022-09-01 RX ORDER — ONDANSETRON 2 MG/ML
4 INJECTION INTRAMUSCULAR; INTRAVENOUS
Status: DISCONTINUED | OUTPATIENT
Start: 2022-09-01 | End: 2022-09-03 | Stop reason: HOSPADM

## 2022-09-01 RX ORDER — SODIUM CHLORIDE 9 MG/ML
75 INJECTION, SOLUTION INTRAVENOUS CONTINUOUS
Status: DISCONTINUED | OUTPATIENT
Start: 2022-09-01 | End: 2022-09-02

## 2022-09-01 RX ADMIN — FAMOTIDINE 20 MG: 20 TABLET ORAL at 17:33

## 2022-09-01 RX ADMIN — HEPARIN SODIUM 5000 UNITS: 5000 INJECTION INTRAVENOUS; SUBCUTANEOUS at 05:01

## 2022-09-01 RX ADMIN — ASPIRIN 325 MG ORAL TABLET 325 MG: 325 PILL ORAL at 09:34

## 2022-09-01 RX ADMIN — ONDANSETRON 4 MG: 2 INJECTION INTRAMUSCULAR; INTRAVENOUS at 18:47

## 2022-09-01 RX ADMIN — HEPARIN SODIUM 5000 UNITS: 5000 INJECTION INTRAVENOUS; SUBCUTANEOUS at 13:13

## 2022-09-01 RX ADMIN — CEFUROXIME AXETIL 500 MG: 250 TABLET ORAL at 13:05

## 2022-09-01 RX ADMIN — LORAZEPAM 1 MG: 1 TABLET ORAL at 09:35

## 2022-09-01 RX ADMIN — PROMETHAZINE HYDROCHLORIDE 25 MG: 12.5 TABLET ORAL at 15:52

## 2022-09-01 RX ADMIN — CEFUROXIME AXETIL 500 MG: 250 TABLET ORAL at 17:33

## 2022-09-01 RX ADMIN — METRONIDAZOLE 500 MG: 500 INJECTION, SOLUTION INTRAVENOUS at 09:35

## 2022-09-01 RX ADMIN — LORAZEPAM 0.5 MG: 2 INJECTION, SOLUTION INTRAMUSCULAR; INTRAVENOUS at 21:39

## 2022-09-01 RX ADMIN — METRONIDAZOLE 500 MG: 500 TABLET ORAL at 21:23

## 2022-09-01 RX ADMIN — ATORVASTATIN CALCIUM 80 MG: 40 TABLET, FILM COATED ORAL at 21:23

## 2022-09-01 RX ADMIN — GABAPENTIN 300 MG: 300 CAPSULE ORAL at 21:24

## 2022-09-01 RX ADMIN — METRONIDAZOLE 500 MG: 500 TABLET ORAL at 15:52

## 2022-09-01 RX ADMIN — THERA TABS 1 TABLET: TAB at 09:34

## 2022-09-01 RX ADMIN — Medication 5 MG: at 21:24

## 2022-09-01 RX ADMIN — Medication 100 MG: at 09:34

## 2022-09-01 RX ADMIN — AMLODIPINE BESYLATE 10 MG: 10 TABLET ORAL at 09:35

## 2022-09-01 RX ADMIN — SODIUM CHLORIDE 75 ML/HR: 9 INJECTION, SOLUTION INTRAVENOUS at 13:13

## 2022-09-01 RX ADMIN — HEPARIN SODIUM 5000 UNITS: 5000 INJECTION INTRAVENOUS; SUBCUTANEOUS at 21:24

## 2022-09-01 RX ADMIN — CALCIUM CARBONATE (ANTACID) CHEW TAB 500 MG 200 MG: 500 CHEW TAB at 09:34

## 2022-09-01 RX ADMIN — CALCIUM CARBONATE (ANTACID) CHEW TAB 500 MG 200 MG: 500 CHEW TAB at 17:33

## 2022-09-01 RX ADMIN — FAMOTIDINE 20 MG: 20 TABLET ORAL at 09:35

## 2022-09-01 RX ADMIN — CALCIUM CARBONATE (ANTACID) CHEW TAB 500 MG 200 MG: 500 CHEW TAB at 13:05

## 2022-09-01 RX ADMIN — NORTRIPTYLINE HYDROCHLORIDE 25 MG: 25 CAPSULE ORAL at 21:23

## 2022-09-01 NOTE — PROGRESS NOTES
[facility]  Progress Note    Patient: Kia Lazo Age: 57 y.o. : 1960 MR#: [id number] SSN: [id number]  Date/Time: 2022     Subjective:     Patient seen and evaluated, lying in bed, no acute distress. 69-year-old male presents to the emergency room with concerns for TIA with leg weakness and slurred speech. Work-up for TIA was noted to be negative. Patient symptoms have resolved at this time. Patient noted to have UTI secondary to an abnormal UA. Urine culture sent. Patient also complaining of left flank pain, will order CT abdomen pelvis. Patient is currently on IV Rocephin, ID consulted. -CT abdomen pelvis reviewed, shows diverticular abscess, colorectal surgery consulted, continue broad-spectrum IV antibiotics, ID on board. Patient continues to complain of left flank/lower abdominal pain. -discussed with Dr. Juma Kowalski this morning, patient has a small diverticular abscess that could likely possibly heal with antibiotic therapy. We will continue current antibiotics, ID on board. Noted events from yesterday where patient had a Coley attempted but had trauma and developed hematuria. At this time he does not require a Coley catheter, patient has been started on Flomax, urology on board. IR will not drain diverticular abscess. Discussed at length with patient and wife, will continue to monitor. Assessment/Plan:     TIA-MRI negative for acute stroke, symptoms have resolved. UTI-IV antibiotics, ID consulted, CT abdomen pelvis reviewed. Urine culture growing gram-negative rods awaiting sensitivities. CT abdomen pelvis shows diverticular abscess, colorectal surgery consulted, continue broad-spectrum IV antibiotics, ID on board. Patient does not need diverticular abscess to be drained by IR at as previously thought. Hypokalemia-repleting  History of hypertension-resume home medications, continue to monitor blood pressure.   Urinary retention secondary to enlarged prostate-continue Flomax  DVT prophylaxis-Heparin  Full code    Discussed with patient, continue to monitor. Called wife at [phone] & updated her. Richard Harding MD  22      Case discussed with:  [x]Patient  []Family  []Nursing  []Case Management  DVT Prophylaxis:  []Lovenox  [x]Hep SQ  []SCDs  []Coumadin   []On Heparin gtt    Objective:   VS: Visit Vitals  BP (!) 159/84 (BP 1 Location: Left upper arm, BP Patient Position: At rest)   Pulse 77   Temp 98 °F (36.7 °C)   Resp 18   Ht 5' 7\" (1.702 m)   Wt 79.4 kg (175 lb)   SpO2 96%   BMI 27.41 kg/m²        Tmax/24hrs: Temp (24hrs), Av.9 °F (36.6 °C), Min:97.6 °F (36.4 °C), Max:98.1 °F (36.7 °C)  IOBRIEF  Intake/Output Summary (Last 24 hours) at 2022 1729  Last data filed at 2022 1454  Gross per 24 hour   Intake 550 ml   Output 480 ml   Net 70 ml         General:  Alert, cooperative, no acute distress    Pulmonary:  CTA Bilaterally. No Wheezing/Rhonchi/Rales. Cardiovascular: Regular rate and Rhythm. GI:  Soft, Non distended,+ Bowel sounds. LLQ pain   Extremities:  No edema, cyanosis, clubbing. No calf tenderness. Neurologic: Alert and oriented X 3. No acute neuro deficits.   Additional:    Medications:   Current Facility-Administered Medications   Medication Dose Route Frequency    0.9% sodium chloride infusion  75 mL/hr IntraVENous CONTINUOUS    metroNIDAZOLE (FLAGYL) tablet 500 mg  500 mg Oral BID    cefUROXime (CEFTIN) tablet 500 mg  500 mg Oral BID    [START ON 2022] tamsulosin (FLOMAX) capsule 0.4 mg  0.4 mg Oral DAILY    oxyCODONE-acetaminophen (PERCOCET) 5-325 mg per tablet 1 Tablet  1 Tablet Oral Q6H PRN    amLODIPine (NORVASC) tablet 10 mg  10 mg Oral DAILY    famotidine (PEPCID) tablet 20 mg  20 mg Oral BID    therapeutic multivitamin (THERAGRAN) tablet 1 Tablet  1 Tablet Oral DAILY    thiamine HCL (B-1) tablet 100 mg  100 mg Oral DAILY    aspirin tablet 325 mg  325 mg Oral DAILY calcium carbonate (TUMS) chewable tablet 200 mg [elemental]  200 mg Oral TID WITH MEALS    atorvastatin (LIPITOR) tablet 80 mg  80 mg Oral QHS    heparin (porcine) injection 5,000 Units  5,000 Units SubCUTAneous Q8H    gabapentin (NEURONTIN) capsule 300 mg  300 mg Oral QHS    LORazepam (ATIVAN) tablet 1 mg  1 mg Oral DAILY    nortriptyline (PAMELOR) capsule 25 mg  25 mg Oral QHS    promethazine (PHENERGAN) tablet 25 mg  25 mg Oral Q4H PRN    LORazepam (ATIVAN) 2 mg/mL injection 0.5 mg  0.5 mg IntraVENous Q6H PRN    hydrALAZINE (APRESOLINE) 20 mg/mL injection 10 mg  10 mg IntraVENous Q6H PRN    melatonin (rapid dissolve) tablet 5 mg  5 mg Oral QHS PRN       Imaging:   XR Results (most recent):  Results from Hospital Encounter encounter on 03/22/16    XR ABD ACUTE W 1 V CHEST    Narrative  Clinical history: Periumbilical pain    EXAMINATION:  Single view of the chest, supine and upright projections of the abdomen    Correlation: 1/1/2016    FINDINGS:  Trachea and heart size are within normal limits. Lungs are clear. There is no  free air beneath the diaphragm. No air-fluid levels are appreciated. Air is  present scattered throughout the colon. No dilated loops of small or large  bowel. Phleboliths are seen in the pelvis. Degenerative changes of the spine. Impression  :  1. No acute primary process. 2. Nonspecific bowel gas pattern. CT Results (most recent):  Results from Hospital Encounter encounter on 08/29/22    CT ABD PELV W CONT    Narrative  EXAM: CT ABD PELV W CONT    CLINICAL INDICATION/HISTORY: R/o Pyelo flank pain when urinating    TECHNIQUE: Contiguous axial images were obtained through the abdomen and pelvis. From these, sagittal and coronal reconstructions were generated.     Contrast : 100 cc [id number]    CT scans at this facility are performed using dose optimization technique as  appropriate with performed exam, to include automated exposure control,  adjustment of mA and/or kV according to patient's size (including appropriate  matching for site-specific examinations), or use of iterative reconstruction  technique. COMPARISON: None    FINDINGS:  Lower chest: Bilateral lung bases are clear. The base of the heart is within  limits. Liver: No focal lesions identified. Gallbladder/Biliary: No calcified gallstones identified. Spleen: Normal in size and contour. Pancreas: Within normal limits for technique. Kidneys, Urinary Bladder:  Symmetric and nonhydronephrotic. Left lateral bladder  diverticulum. Wall thickening in the superior urinary bladder. Adrenal Glands: Unremarkable    Bowels/Mesentery: Diverticulosis of the sigmoid colon with mild inflammatory  wall thickening of the mid sigmoid colon. There is a 4.4 x 2.9 x 1.6 cm  collection inferior to the sigmoid colon along superior margin of the urinary  bladder which is of the urinary bladder wall thickening. Small foci of air. Peritoneum/Abdominal Wall: No free air or free fluid identified. Pelvic organs: Unremarkable    Vascular: Aorta unremarkable for age. IVC unremarkable. Lymph Nodes: No adenopathy. Bones: No acute findings. Unremarkable for age. Impression  1. Sigmoid diverticulitis. Small collection interposed between the sigmoid  colon and urinary bladder may represent a diverticular abscess. Bladder wall  thickening along the superior margin adjacent to the collection could be  reactive. 2.  No hydronephrosis or perinephric fat stranding to suggest pyonephritis. 3.  Left lateral bladder diverticulum. MRI Results (most recent):  Results from [place] encounter on 08/29/22    MRI BRAIN WO CONT    Narrative  EXAM: MRI BRAIN WO CONT    CLINICAL INDICATION/HISTORY: Persistent nausea vomiting anxiety lower extremity  weakness symptoms suggest orthostatic hypotension    TECHNIQUE: Multisequence multiplanar MR imaging acquired through the brain.     Contrast used: None    COMPARISON: Head CT August 29, 2022    FINDINGS:    Parenchyma: Cerebellum demonstrates an old watershed infarction involving the  right hemisphere along its more superior aspect    Defect in the axial plane measuring 18 x 14 mm in the sagittal plane 14 mm    Today's diffusion images demonstrate no evidence of an acute infarction    Some minimal scattered gliotic changes in the hemispheres identified nonspecific    No evidence of a bleed    No mass lesion. CSF spaces: Ventricles and cisterns remain midline in position    IAC regions: Unremarkable    Parasellar region: Unremarkable    Vasculature: Appropriate flow voids within the major skull base vasculature. 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    No evidence of acute infarction bleed or mass identified        Labs:    Recent Results (from the past 48 hour(s))   HGB & HCT    Collection Time: 08/31/22  8:52 PM   Result Value Ref Range    HGB 11.2 (L) 13.0 - 16.0 g/dL    HCT 32.8 (L) 36.0 - 48.0 %   CBC WITH AUTOMATED DIFF    Collection Time: 09/01/22 10:07 AM   Result Value Ref Range    WBC 8.0 4.6 - 13.2 K/uL    RBC 3.75 (L) 4.35 - 5.65 M/uL    HGB 10.9 (L) 13.0 - 16.0 g/dL    HCT 32.6 (L) 36.0 - 48.0 %    MCV 86.9 78.0 - 100.0 FL    MCH 29.1 24.0 - 34.0 PG    MCHC 33.4 31.0 - 37.0 g/dL    RDW 13.9 11.6 - 14.5 %    PLATELET 790 213 - 754 K/uL    MPV 9.7 9.2 - 11.8 FL    NRBC 0.0 0  WBC    ABSOLUTE NRBC 0.00 0.00 - 0.01 K/uL    NEUTROPHILS 76 (H) 40 - 73 %    LYMPHOCYTES 12 (L) 21 - 52 %    MONOCYTES 7 3 - 10 %    EOSINOPHILS 5 0 - 5 %    BASOPHILS 1 0 - 2 %    IMMATURE GRANULOCYTES 0 0.0 - 0.5 %    ABS. NEUTROPHILS 6.1 1.8 - 8.0 K/UL    ABS. LYMPHOCYTES 1.0 0.9 - 3.6 K/UL    ABS. MONOCYTES 0.5 0.05 - 1.2 K/UL    ABS. EOSINOPHILS 0.4 0.0 - 0.4 K/UL    ABS. BASOPHILS 0.1 0.0 - 0.1 K/UL    ABS. IMM.  GRANS. 0.0 0.00 - 0.04 K/UL    DF AUTOMATED     METABOLIC PANEL, BASIC    Collection Time: 09/01/22 10:07 AM   Result Value Ref Range    Sodium 139 136 - 145 mmol/L    Potassium 3.0 (L) 3.5 - 5.5 mmol/L    Chloride 100 100 - 111 mmol/L    CO2 35 (H) 21 - 32 mmol/L    Anion gap 4 3.0 - 18 mmol/L    Glucose 94 74 - 99 mg/dL    BUN 11 7.0 - 18 MG/DL    Creatinine 0.80 0.6 - 1.3 MG/DL    BUN/Creatinine ratio 14 12 - 20      GFR est AA >60 >60 ml/min/1.73m2    GFR est non-AA >60 >60 ml/min/1.73m2    Calcium 8.4 (L) 8.5 - 10.1 MG/DL       Signed By: Esvin Graham MD     September 1, 2022      I spent 25 minutes with the patient in face-to-face consultation, of which greater than 50% was spent in counseling and coordination of care as described above    Disclaimer: Sections of this note are dictated using utilizing voice recognition software. Minor typographical errors may be present. If questions arise, please do not hesitate to contact me or call our department.

## 2022-09-01 NOTE — PROGRESS NOTES
Problem: Dysphagia (Adult)  Goal: *Acute Goals and Plan of Care (Insert Text)  Description:   Patient will:  1. Tolerate PO trials with 0 s/s overt distress in 4/5 trials- met  2. Utilize compensatory swallow strategies/maneuvers (decrease bite/sip, size/rate, alt. liq/sol) with min cues in 4/5 trials- met    Rec:     Reg solid with thin liquids  Aspiration precautions  HOB >45 during po intake, remain >30 for 30-45 minutes after po   Small bites/sips; alternate liquid/solid with slow feeding rate   Oral care TID  Meds per pt preference    Outcome: Resolved/Met   [id number] [facility] TREATMENT & DISCHARGE    Patient: Gonzales Sharp (82 y.o. male)  Date: 9/1/2022  Diagnosis: TIA (transient ischemic attack) [G45.9]  LEYLA (acute kidney injury) ([address] [N17.9]  Hypokalemia [E87.6]  Hypotension [I95.9] <principal problem not specified>      Precautions: aspiration Fall  PLOF: As per H&P     ASSESSMENT:  Pt was seen at bedside for follow up dysphagia management. Pt and family reported no difficulty with PO. No concern for speech/lang deficits. Observed with small sip of thin at bedside without any overt s/sx of aspiration. Laryngeal elevation functional to palpation. Rec to continue reg solid diet with thin liquids, aspiration precautions, oral care TID, and meds as tolerated. Educated on safe swallowing techniques/strategies. No further skilled SLP services are indicated at this time. Please re-consult if needed. Progression toward goals:  [x]         Goals met/approximated  []         Not making progress/Not appropriate - will d/c POC     PLAN:  Recommendations and Planned Interventions:  Maximum therapeutic gains met; safest, least restrictive diet achieved. D/C ST intervention at this time. SUBJECTIVE:   Patient stated I'm doing good.     OBJECTIVE:   Cognitive and Communication Status:  Neurologic State: Alert  Orientation Level: Oriented X4  Cognition: Follows commands  Perception: Appears intact  Perseveration: No perseveration noted  Safety/Judgement: Fall prevention    Dysphagia Treatment: see above     PAIN:  Start of Tx: 0  End of Tx: 0     After treatment:   []              Patient left in no apparent distress sitting up in chair  [x]              Patient left in no apparent distress in bed  [x]              Call bell left within reach  [x]              Nursing notified  []              Caregiver present  []              Bed alarm activated    COMMUNICATION/EDUCATION:   [x] Aspiration precautions; swallow safety; compensatory techniques  [x]        Patient/family able to participate in training and education   []  Patient unable to participate in education; education ongoing with staff  [] Patient understands goals/intent of therapy; neutral about participation     Thank you for this referral.    Yulia Serna M.S., CCC-SLP/L  Speech-Language Pathologist

## 2022-09-01 NOTE — PROGRESS NOTES
[facility] [address] [place] [id number]  [phone]  Colon and Rectal Surgery Progress Note      Patient: Kylie Portillo MRN: [id number]  SSN: [id number]    YOB: 1960  Age: 57 y.o. Sex: male      Admit Date: 8/29/2022    LOS: 3 days     Subjective:     Pain is minimal.  Had difficulty with Coley catheter placement in preparation for FNA of pericolonic abscess. Objective:     Vitals:    08/31/22 2031 09/01/22 0005 09/01/22 0458 09/01/22 0756   BP: (!) 143/83 130/80 123/78 130/82   Pulse: 82 85 73 74   Resp: 20 20 18 18   Temp: 98.1 °F (36.7 °C) 97.7 °F (36.5 °C) 97.6 °F (36.4 °C) 98.1 °F (36.7 °C)   SpO2: 95% 96% 96% 94%   Weight:       Height:            Intake and Output:  Current Shift: No intake/output data recorded.   Last three shifts: 08/30 1901 - 09/01 0700  In: 100 [I.V.:100]  Out: 450 [Urine:450]    Physical Exam:     Constitutional: well developed, in NAD  Abdomen: Soft, very mildly tender in the lower midline, nondistended    Lab/Data Review:    CBC:   Lab Results   Component Value Date/Time    HGB 11.2 (L) 08/31/2022 08:52 PM    HCT 32.8 (L) 08/31/2022 08:52 PM        Assessment:     Diverticulitis with small pelvic abscess besides bladder    Plan:     Given difficulty with Coley catheter placement would recommend oral antibiotics, advancement of diet and discharge  He will likely resolve this abscess with oral antibiotic therapy given its size  Patient to follow-up with me in the office in 2 weeks  I have discussed the option of surgery with the patient already feel he is a candidate  Call with questions or concerns    Signed By: Jacob Chalres MD        September 1, 2022

## 2022-09-01 NOTE — PROGRESS NOTES
Urology Progress Note        Assessment/Plan:     Patient Active Problem List   Diagnosis Code    Diverticulitis of colon (without mention of hemorrhage)(562.11) K57.32    Abdominal pain R10.9    Nausea and vomiting R11.2    Elevated blood pressure [id number]    Right bundle branch block I45.10    Psychiatric disorder F99    Gastrointestinal disorder K92.9    Hypertension I10    Anxiety and depression F41.9, F32. A    Arthritis M19.90    BPH (benign prostatic hyperplasia) N40.0    Constipation K59.00    Diverticula of colon K57.30    Erectile dysfunction due to diseases classified elsewhere N52.1    GERD (gastroesophageal reflux disease) K21.9    Hypopotassemia E87.6    Lower urinary tract symptoms (LUTS) R39.9    Peyronie's disease N48.6    Prostate disease N42.9    Wound, open, finger S61.209A    Backache M54.9    Lipid disorder E78.9    Myalgia and myositis, unspecified [id number]    Tobacco abuse Z72.0    Ventral hernia K43.9    ERRONEOUS ENCOUNTER--DISREGARD     Complete rotator cuff tear or rupture of right shoulder, not specified as traumatic M75.121    TIA (transient ischemic attack) G45.9    Hypokalemia E87.6    Hypotension I95.9    LEYLA (acute kidney injury) ([facility] [address] N17.9       ASSESSMENT:   - Gross hematuria 2/2 traumatic placement of catheter              Hgb 10.9<11.2<10.6<11.1     - BPH with LUTS  - UTI              Ucx 8/29/22 >100K GNR               Normal WBC 8.0<11.8              Afebrile, VSS     - LEYLA- improving               Creat 1.17<1.8<2.16              Baseline 0.6 in 6/2022                - Diverticular abscess, Sigmoid diverticulitis     PLAN:    - Appreciate consult  - Per colorectal surgeon, recommends PO ABX for abscess. No need for drainage with IR.  - Patient voiding ok at this time with PVR of 0 cc. Continue to monitor PVRs.  Will defer roger placement at this time.  - Gross hematuria improving per patient, tea colored in urinal.  - Recommend start Flomax for BPH, hesitancy, slow start to urinate. - Ucx shows GNR, currently on Rocephin and Flagyl per primary  - Trend labs, Hgb stable at this time  - Following        Follow up arranged? Patricia Huizar PA-C  [facility]  Available Josse Coley  Pager: [phone]       I personally obtained history, examined the patient, reviewed pertinent labs and imaging, and directed the management plan formulated with the physician assistant. I reviewed the documentation of the encounter by the PA, I made changes where needed, and I agree with findings, assessment, and plan as documented. Carmelita Chahal MD  [facility]      Subjective:     Daily Progress Note: 2022 3:24 PM    Ольга Ahn is doing well. VSS. Complains of hematuria with blood coming out of urethra, improved from this morning. Objective:     Visit Vitals  BP (!) 146/86 (BP 1 Location: Left upper arm, BP Patient Position: At rest)   Pulse 74   Temp 97.9 °F (36.6 °C)   Resp 18   Ht 5' 7\" (1.702 m)   Wt 79.4 kg (175 lb)   SpO2 97%   BMI 27.41 kg/m²        Temp (24hrs), Av.9 °F (36.6 °C), Min:97.6 °F (36.4 °C), Max:98.1 °F (36.7 °C)      Intake and Output:  1901 -  0700  In: 100 [I.V.:100]  Out: 450 [Urine:450]   07 -  1900  In: 100 [I.V.:100]  Out: 180 [Urine:180]    PHYSICAL EXAMINATION:   Visit Vitals  BP (!) 146/86 (BP 1 Location: Left upper arm, BP Patient Position: At rest)   Pulse 74   Temp 97.9 °F (36.6 °C)   Resp 18   Ht 5' 7\" (1.702 m)   Wt 79.4 kg (175 lb)   SpO2 97%   BMI 27.41 kg/m²     Constitutional: Well developed, well nourished. No acute distress. HEENT: Normocephalic, Atraumatic, EOM's intact   CV:  no edema  Respiratory: No respiratory distress or difficulties breathing   Abdomen:  Soft and non-tender   Male:            PENIS: circumcised. Urinal with tea colored urine. Skin: No evidence of jaundice. Normal color  Neuro/Psych:  Alert and oriented. Affect appropriate. Lab/Data Review:   All lab results for the last 24 hours reviewed.     Labs:     Labs: Results:   Chemistry    Recent Labs     09/01/22 1007 08/30/22  0146 08/29/22  1805   GLU 94 102* 127*    142 138   K 3.0* 3.3* 2.7*    105 101   CO2 35* 29 33*   BUN 11 13 16   CREA 0.80 1.17 1.80*   CA 8.4* 7.9* 7.7*   AGAP 4 8 4   BUCR 14 11* 9*      CBC w/Diff Recent Labs     09/01/22 1007 08/31/22 2052 08/30/22  0146   WBC 8.0  --  11.8   RBC 3.75*  --  3.69*   HGB 10.9* 11.2* 10.6*   HCT 32.6* 32.8* 32.0*     --  442*   GRANS 76*  --   --    LYMPH 12*  --   --    EOS 5  --   --       Cultures Recent Labs     08/29/22  1800   CULT GRAM NEGATIVE RODS*     All Micro Results       Procedure Component Value Units Date/Time    CULTURE, URINE [[id number]]  (Abnormal) Collected: 08/29/22 1800    Order Status: Completed Specimen: Urine from Clean catch Updated: 08/31/22 1608     Special Requests: NO SPECIAL REQUESTS        [place] Count --        >100,000  COLONIES/mL       Culture result: GRAM NEGATIVE RODS       CULTURE, BODY FLUID Roosradhika Davisy STAIN [[id number]]     Order Status: Sent Specimen: Drainage               Urinalysis Color   Date Value Ref Range Status   08/29/2022 DARK YELLOW   Final     Appearance   Date Value Ref Range Status   08/29/2022 TURBID   Final     Specific gravity   Date Value Ref Range Status   08/29/2022 1.022 1.005 - 1.030   Final     pH (UA)   Date Value Ref Range Status   08/29/2022 5.5 5.0 - 8.0   Final     Protein   Date Value Ref Range Status   08/29/2022 300 (A) NEG mg/dL Final     Ketone   Date Value Ref Range Status   08/29/2022 TRACE (A) NEG mg/dL Final     Bilirubin   Date Value Ref Range Status   08/29/2022 Negative NEG   Final     Blood   Date Value Ref Range Status   08/29/2022 LARGE (A) NEG   Final     Urobilinogen   Date Value Ref Range Status   08/29/2022 1.0 0.2 - 1.0 EU/dL Final     Nitrites   Date Value Ref Range Status   08/29/2022 Negative NEG   Final     Leukocyte Esterase   Date Value Ref Range Status   08/29/2022 LARGE (A) NEG   Final     Potassium   Date Value Ref Range Status   09/01/2022 3.0 (L) 3.5 - 5.5 mmol/L Final     Creatinine   Date Value Ref Range Status   09/01/2022 0.80 0.6 - 1.3 MG/DL Final     BUN   Date Value Ref Range Status   09/01/2022 11 7.0 - 18 MG/DL Final     Prostate Specific Ag   Date Value Ref Range Status   11/12/2021 1.21 0.00 - 4.00 ng/mL Final     Comment:     (Methodology: Roche ECLIA)           PSA No results for input(s): PSA in the last 72 hours.    Coagulation No results found for: PTP, INR, APTT, INREXT

## 2022-09-01 NOTE — PROGRESS NOTES
Interventional Radiology    Coley was unable to be placed last night or this morning. Will be unable to perform aspiration today due to this. The collection is posterior to the bladder and unless it is completely decompressed there is no window. If patients clinical status worsens, would re-image and IR will re-evaluate to determine if it is amenable. No IR intervention at this time.        Thank you,  Ron ESQUEDA  [phone]
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
## 2022-09-01 NOTE — PROGRESS NOTES
0706-Bedside and Verbal shift change report given to Meagan Perez (oncoming nurse) by Gregory Sandoval (offgoing nurse). Report included the following information SBAR, Intake/Output, MAR, and Cardiac Rhythm NSR .      0800-Received call from Urology YIN Benoit if patient has urine output & w/ TO to do bladder scan now to to know if they will need to put the patient on schedule for OR placement roger cath. Bladder scan done w/ 0 ml. YIN Domingo informed. 1048-Patients family called and asking for clarification on what the MD told them. They wanted to speak w/ MD for clarification of what the plan of care. Dr. Raúl Wheat was paged. 1109-Dr Aurora Camarena at the floor,made aware of the patient s family's question regarding the plan of care, Infectious dse  said she will explain  & speak to the family. 1230-Paged & spoke with ,inform regarding the want of having the drain or the procedure to be done before they will be discharge in the hospital because they know this will happen again on him. 1400- Patient has not voided since my shift started bladder scan was done w/ 178 ml urine in bladder. Paged & spoke w/ urology YIN Canales,informed about the situation and also made aware of the patients wanting to have the roger cath be inserted but in OR or w/o him feeling the pain Also inform regarding the want of having the drain or the procedure to be done before they will be discharge in the hospital because they know this will happen again on him. Urology PA said they will see & assess him later. Patient made aware. 1435- came and explained everything to patient wife. Both now verbalizing the understanding and agreeing on the plan of care. Both where very appreciative the time given to explain everything well. 1448-YIN Noland urology came and check on patient. Patient has urinated in the [facility] during urology visit. Suggested to continue doing bladder scan to make sure that patient had been emptying bladder well. 1550-Patient complaining of nausea. PRN nausea meds given. 1610-Urology Dr. Cielo Amaya at bedside. Ordered to have another bladder scan now then page them the result. 1658-PVR done w/ 130 ml,paged & spoke w/ YIN Noland still sugesting to continue bladder scan & there was an order placed for Flomax to start. Will acknowledge order.
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
## 2022-09-01 NOTE — PROCEDURES
[address] [facility], [address] [place] [address]  Tel: [phone]      Procedure Note  9/1/2022      [facility] has been consulted by Dr. Malena Atkinson for difficult roger catheter placement after unsuccessful attempts by nursing staff. Rea Mann has been admitted for diverticular abscess and requires roger catheter placement for IR procedure. Procedure: The patient was draped and prepped in the usual sterile manner. Urethral meatus was grossly bloody with clots, was cleaned with betadine and 10cc of Lidocane gel was instilled in the urethral meatus/urethra. Unable to pass 18fr coude catheter, suspected false passage. A guide-wire was then passed through with some difficulty, unlikely in bladder, unable to pass a [place] over wire, procedure was aborted. Gross hematuria present and pt able to void after procedure. The patient did not tolerate the procedure well and refuses bedside cystoscopy for catheter placement.     SEE FULL CONSULT NOTE       Sujey Messer NP-BC  [facility], [facility]   Pager [phone]    Office [phone]

## 2022-09-01 NOTE — PROGRESS NOTES
SLP Note:    Pt NPO for testing/procedure. Will hold dysphagia management accordingly and continue to follow per POC.      Lucia Camara M.S., [address] [facility]  Speech-Language Pathologist

## 2022-09-01 NOTE — PROGRESS NOTES
1938: Pt complained of pain 10/10 on penis, assessed pain, no urine in the collection bag, pt had roger inserted earlier on day shift for scheduled procedure (09/01/2022). Pt appeared to be in discomfort and wife at the bedside. Assessed roger and it appeared not advanced. Used a 10 cc syringe to pull back the fluid in order to deflate the balloon, and noted bleeding through the roger. Removed the roger and the pt was relieved of pain per pt    Pt voided using urinal after the roger was discontinued, yet continue to bleed. @18: Paged and notified Dr. Galen SMITH gave a telephone order, with read back for start H&H. Pt is alert and awake, will continue to monitor pt.

## 2022-09-01 NOTE — PROGRESS NOTES
Bedside and Verbal shift change report given to [address] (oncoming nurse) by Mirna Cr (offgoing nurse). Report included the following information SBAR, Kardex, and Cardiac Rhythm NSR .

## 2022-09-01 NOTE — PROGRESS NOTES
Bedside shift change report given to [place], RN (oncoming nurse) by MELE Munoz (offgoing nurse). Report included the following information SBAR, Kardex, Intake/Output, MAR, and Cardiac Rhythm NSR .

## 2022-09-02 LAB
ANION GAP SERPL CALC-SCNC: 5 MMOL/L (ref 3–18)
BACTERIA SPEC CULT: ABNORMAL
BUN SERPL-MCNC: 10 MG/DL (ref 7–18)
BUN/CREAT SERPL: 14 (ref 12–20)
CALCIUM SERPL-MCNC: 8.4 MG/DL (ref 8.5–10.1)
CC UR VC: ABNORMAL
CHLORIDE SERPL-SCNC: 101 MMOL/L (ref 100–111)
CO2 SERPL-SCNC: 32 MMOL/L (ref 21–32)
CREAT SERPL-MCNC: 0.74 MG/DL (ref 0.6–1.3)
GLUCOSE SERPL-MCNC: 100 MG/DL (ref 74–99)
MAGNESIUM SERPL-MCNC: 1.7 MG/DL (ref 1.6–2.6)
POTASSIUM SERPL-SCNC: 2.9 MMOL/L (ref 3.5–5.5)
SERVICE CMNT-IMP: ABNORMAL
SODIUM SERPL-SCNC: 138 MMOL/L (ref 136–145)

## 2022-09-02 PROCEDURE — 74011250637 HC RX REV CODE- 250/637: Performed by: INTERNAL MEDICINE

## 2022-09-02 PROCEDURE — 74011250636 HC RX REV CODE- 250/636: Performed by: NURSE PRACTITIONER

## 2022-09-02 PROCEDURE — 74011250637 HC RX REV CODE- 250/637: Performed by: HOSPITALIST

## 2022-09-02 PROCEDURE — 74011250637 HC RX REV CODE- 250/637: Performed by: NURSE PRACTITIONER

## 2022-09-02 PROCEDURE — 74011250637 HC RX REV CODE- 250/637: Performed by: STUDENT IN AN ORGANIZED HEALTH CARE EDUCATION/TRAINING PROGRAM

## 2022-09-02 PROCEDURE — 74011250637 HC RX REV CODE- 250/637: Performed by: EMERGENCY MEDICINE

## 2022-09-02 PROCEDURE — 74011250637 HC RX REV CODE- 250/637: Performed by: PHYSICIAN ASSISTANT

## 2022-09-02 PROCEDURE — 65660000004 HC RM CVT STEPDOWN

## 2022-09-02 PROCEDURE — 74011250636 HC RX REV CODE- 250/636: Performed by: HOSPITALIST

## 2022-09-02 PROCEDURE — 83735 ASSAY OF MAGNESIUM: CPT

## 2022-09-02 PROCEDURE — 36415 COLL VENOUS BLD VENIPUNCTURE: CPT

## 2022-09-02 PROCEDURE — 80048 BASIC METABOLIC PNL TOTAL CA: CPT

## 2022-09-02 PROCEDURE — 74011250636 HC RX REV CODE- 250/636: Performed by: INTERNAL MEDICINE

## 2022-09-02 PROCEDURE — 99232 SBSQ HOSP IP/OBS MODERATE 35: CPT | Performed by: INTERNAL MEDICINE

## 2022-09-02 RX ORDER — ATORVASTATIN CALCIUM 40 MG/1
40 TABLET, FILM COATED ORAL
Status: DISCONTINUED | OUTPATIENT
Start: 2022-09-02 | End: 2022-09-03 | Stop reason: HOSPADM

## 2022-09-02 RX ORDER — POTASSIUM CHLORIDE 7.45 MG/ML
10 INJECTION INTRAVENOUS
Status: COMPLETED | OUTPATIENT
Start: 2022-09-02 | End: 2022-09-02

## 2022-09-02 RX ORDER — FAMOTIDINE 20 MG/1
20 TABLET, FILM COATED ORAL 2 TIMES DAILY
Qty: 60 TABLET | Refills: 0 | Status: SHIPPED | OUTPATIENT
Start: 2022-09-02 | End: 2022-09-26

## 2022-09-02 RX ORDER — GUAIFENESIN 100 MG/5ML
81 LIQUID (ML) ORAL DAILY
Status: DISCONTINUED | OUTPATIENT
Start: 2022-09-03 | End: 2022-09-03 | Stop reason: HOSPADM

## 2022-09-02 RX ORDER — OXYCODONE AND ACETAMINOPHEN 5; 325 MG/1; MG/1
1 TABLET ORAL
Qty: 20 TABLET | Refills: 0 | Status: SHIPPED | OUTPATIENT
Start: 2022-09-02 | End: 2022-09-05

## 2022-09-02 RX ORDER — MAGNESIUM SULFATE HEPTAHYDRATE 40 MG/ML
2 INJECTION, SOLUTION INTRAVENOUS ONCE
Status: COMPLETED | OUTPATIENT
Start: 2022-09-02 | End: 2022-09-02

## 2022-09-02 RX ORDER — POTASSIUM CHLORIDE 20 MEQ/1
40 TABLET, EXTENDED RELEASE ORAL DAILY
Qty: 12 TABLET | Refills: 0 | Status: SHIPPED | OUTPATIENT
Start: 2022-09-02 | End: 2022-09-08

## 2022-09-02 RX ORDER — POTASSIUM CHLORIDE 20 MEQ/1
40 TABLET, EXTENDED RELEASE ORAL EVERY 4 HOURS
Status: COMPLETED | OUTPATIENT
Start: 2022-09-02 | End: 2022-09-02

## 2022-09-02 RX ORDER — LEVOFLOXACIN 500 MG/1
500 TABLET, FILM COATED ORAL EVERY 24 HOURS
Status: DISCONTINUED | OUTPATIENT
Start: 2022-09-02 | End: 2022-09-03 | Stop reason: HOSPADM

## 2022-09-02 RX ORDER — TAMSULOSIN HYDROCHLORIDE 0.4 MG/1
0.4 CAPSULE ORAL DAILY
Qty: 30 CAPSULE | Refills: 0 | Status: SHIPPED | OUTPATIENT
Start: 2022-09-03 | End: 2022-10-03

## 2022-09-02 RX ORDER — LEVOFLOXACIN 500 MG/1
500 TABLET, FILM COATED ORAL DAILY
Qty: 30 TABLET | Refills: 0 | Status: SHIPPED | OUTPATIENT
Start: 2022-09-02 | End: 2022-10-02

## 2022-09-02 RX ORDER — METRONIDAZOLE 500 MG/1
500 TABLET ORAL 2 TIMES DAILY
Qty: 60 TABLET | Refills: 0 | Status: SHIPPED | OUTPATIENT
Start: 2022-09-02 | End: 2022-10-02

## 2022-09-02 RX ADMIN — METRONIDAZOLE 500 MG: 500 TABLET ORAL at 16:09

## 2022-09-02 RX ADMIN — METRONIDAZOLE 500 MG: 500 TABLET ORAL at 03:15

## 2022-09-02 RX ADMIN — ASPIRIN 325 MG ORAL TABLET 325 MG: 325 PILL ORAL at 08:08

## 2022-09-02 RX ADMIN — LORAZEPAM 0.5 MG: 2 INJECTION, SOLUTION INTRAMUSCULAR; INTRAVENOUS at 22:18

## 2022-09-02 RX ADMIN — CEFUROXIME AXETIL 500 MG: 250 TABLET ORAL at 08:11

## 2022-09-02 RX ADMIN — POTASSIUM CHLORIDE 10 MEQ: 7.46 INJECTION, SOLUTION INTRAVENOUS at 12:37

## 2022-09-02 RX ADMIN — CALCIUM CARBONATE (ANTACID) CHEW TAB 500 MG 200 MG: 500 CHEW TAB at 16:11

## 2022-09-02 RX ADMIN — LORAZEPAM 1 MG: 1 TABLET ORAL at 08:09

## 2022-09-02 RX ADMIN — Medication 5 MG: at 22:11

## 2022-09-02 RX ADMIN — TAMSULOSIN HYDROCHLORIDE 0.4 MG: 0.4 CAPSULE ORAL at 08:51

## 2022-09-02 RX ADMIN — NORTRIPTYLINE HYDROCHLORIDE 25 MG: 25 CAPSULE ORAL at 22:11

## 2022-09-02 RX ADMIN — LEVOFLOXACIN 500 MG: 500 TABLET, FILM COATED ORAL at 22:11

## 2022-09-02 RX ADMIN — AMLODIPINE BESYLATE 10 MG: 10 TABLET ORAL at 08:09

## 2022-09-02 RX ADMIN — HEPARIN SODIUM 5000 UNITS: 5000 INJECTION INTRAVENOUS; SUBCUTANEOUS at 22:10

## 2022-09-02 RX ADMIN — Medication 100 MG: at 08:09

## 2022-09-02 RX ADMIN — HYDRALAZINE HYDROCHLORIDE 10 MG: 20 INJECTION, SOLUTION INTRAMUSCULAR; INTRAVENOUS at 03:15

## 2022-09-02 RX ADMIN — THERA TABS 1 TABLET: TAB at 08:08

## 2022-09-02 RX ADMIN — POTASSIUM CHLORIDE 40 MEQ: 1500 TABLET, EXTENDED RELEASE ORAL at 16:09

## 2022-09-02 RX ADMIN — CALCIUM CARBONATE (ANTACID) CHEW TAB 500 MG 200 MG: 500 CHEW TAB at 12:38

## 2022-09-02 RX ADMIN — HEPARIN SODIUM 5000 UNITS: 5000 INJECTION INTRAVENOUS; SUBCUTANEOUS at 05:46

## 2022-09-02 RX ADMIN — FAMOTIDINE 20 MG: 20 TABLET ORAL at 18:26

## 2022-09-02 RX ADMIN — MAGNESIUM SULFATE HEPTAHYDRATE 2 G: 40 INJECTION, SOLUTION INTRAVENOUS at 16:09

## 2022-09-02 RX ADMIN — SODIUM CHLORIDE 75 ML/HR: 9 INJECTION, SOLUTION INTRAVENOUS at 18:26

## 2022-09-02 RX ADMIN — ATORVASTATIN CALCIUM 40 MG: 40 TABLET, FILM COATED ORAL at 22:10

## 2022-09-02 RX ADMIN — ONDANSETRON 4 MG: 2 INJECTION INTRAMUSCULAR; INTRAVENOUS at 03:25

## 2022-09-02 RX ADMIN — FAMOTIDINE 20 MG: 20 TABLET ORAL at 08:09

## 2022-09-02 RX ADMIN — HEPARIN SODIUM 5000 UNITS: 5000 INJECTION INTRAVENOUS; SUBCUTANEOUS at 13:45

## 2022-09-02 RX ADMIN — OXYCODONE HYDROCHLORIDE AND ACETAMINOPHEN 1 TABLET: 5; 325 TABLET ORAL at 20:41

## 2022-09-02 RX ADMIN — GABAPENTIN 300 MG: 300 CAPSULE ORAL at 22:11

## 2022-09-02 RX ADMIN — POTASSIUM CHLORIDE 40 MEQ: 1500 TABLET, EXTENDED RELEASE ORAL at 12:35

## 2022-09-02 RX ADMIN — POTASSIUM CHLORIDE 10 MEQ: 7.46 INJECTION, SOLUTION INTRAVENOUS at 13:46

## 2022-09-02 NOTE — PROGRESS NOTES
[facility] Hospitalist Group  Progress Note    Patient: Ashley Dubon Age: 57 y.o. : 1960 MR#: [id number] SSN: [id number]  Date/Time: 2022     Subjective:     Patient seen and evaluated, sitting on chair at bedside, no acute distress. Wife present at bedside. Assessment/Plan:   65-year-old male presents to the emergency room with concerns for TIA with leg weakness and slurred speech. Work-up for TIA was noted to be negative. Patient symptoms have resolved at this time. Patient noted to have evidence of UTI.      -CT abdomen pelvis reviewed, shows diverticular abscess, colorectal surgery consulted, continue broad-spectrum IV antibiotics, ID on board. Patient continues to complain of left flank/lower abdominal pain. - previous hospitalist discussed with Dr. Katt Vazquez , patient has a small diverticular abscess that could likely possibly heal with antibiotic therapy. We will continue current antibiotics, ID on board. Noted events from recently where patient had a Coley attempted but had trauma and developed hematuria. He does not require a Coley catheter, patient has been started on Flomax, urology on board. IR will not drain diverticular abscess. TIA-ruled out, MRI negative for acute stroke, symptoms have resolved. Will decrease statin and asa dosing. UTI-On antibiotics, ID consulted, CT abdomen pelvis reviewed. Urine culture --> E.coli, pansensitive. CT abdomen pelvis shows diverticular abscess, colorectal surgery consulted, on antibiotics, ID on board. Patient does not need diverticular abscess to be drained by IR at as previously thought. Hypokalemia-repleting, mag low normal, will replace. History of hypertension-resume home medications, continue to monitor blood pressure. Urinary retention secondary to enlarged prostate-continue Flomax  DVT prophylaxis-Heparin  Full code    Discussed with patient, continue to monitor.   Wife was present at bedside, updated her. Yoko Alvarado MD  22      Case discussed with:  [x]Patient  []Family  []Nursing  []Case Management  DVT Prophylaxis:  []Lovenox  [x]Hep SQ  []SCDs  []Coumadin   []On Heparin gtt    Objective:   VS: Visit Vitals  /69 (BP 1 Location: Left upper arm, BP Patient Position: At rest)   Pulse 84   Temp 98.1 °F (36.7 °C)   Resp 18   Ht 5' 7\" (1.702 m)   Wt 79.4 kg (175 lb)   SpO2 96%   BMI 27.41 kg/m²        Tmax/24hrs: Temp (24hrs), Av.4 °F (36.9 °C), Min:98 °F (36.7 °C), Max:99.3 °F (37.4 °C)  IOBRIEF  Intake/Output Summary (Last 24 hours) at 2022 1604  Last data filed at 2022 1546  Gross per 24 hour   Intake --   Output 1350 ml   Net -1350 ml       General:  Alert, cooperative, no acute distress    Pulmonary:  CTA Bilaterally. No Wheezing/Rhonchi/Rales. Cardiovascular: Regular rate and Rhythm. GI:  Soft, Non distended,+ Bowel sounds. LLQ pain   Extremities:  No edema, cyanosis, clubbing. No calf tenderness. Neurologic: Alert and oriented X 3. No acute neuro deficits.   Additional:    Medications:   Current Facility-Administered Medications   Medication Dose Route Frequency    potassium chloride (K-DUR, KLOR-CON M20) SR tablet 40 mEq  40 mEq Oral Q4H    magnesium sulfate 2 g/50 ml IVPB (premix or compounded)  2 g IntraVENous ONCE    0.9% sodium chloride infusion  75 mL/hr IntraVENous CONTINUOUS    metroNIDAZOLE (FLAGYL) tablet 500 mg  500 mg Oral BID    cefUROXime (CEFTIN) tablet 500 mg  500 mg Oral BID    tamsulosin (FLOMAX) capsule 0.4 mg  0.4 mg Oral DAILY    ondansetron (ZOFRAN) injection 4 mg  4 mg IntraVENous Q6H PRN    oxyCODONE-acetaminophen (PERCOCET) 5-325 mg per tablet 1 Tablet  1 Tablet Oral Q6H PRN    amLODIPine (NORVASC) tablet 10 mg  10 mg Oral DAILY    famotidine (PEPCID) tablet 20 mg  20 mg Oral BID    therapeutic multivitamin (THERAGRAN) tablet 1 Tablet  1 Tablet Oral DAILY    thiamine HCL (B-1) tablet 100 mg  100 mg Oral DAILY    aspirin tablet 325 mg  325 mg Oral DAILY    calcium carbonate (TUMS) chewable tablet 200 mg [elemental]  200 mg Oral TID WITH MEALS    atorvastatin (LIPITOR) tablet 80 mg  80 mg Oral QHS    heparin (porcine) injection 5,000 Units  5,000 Units SubCUTAneous Q8H    gabapentin (NEURONTIN) capsule 300 mg  300 mg Oral QHS    LORazepam (ATIVAN) tablet 1 mg  1 mg Oral DAILY    nortriptyline (PAMELOR) capsule 25 mg  25 mg Oral QHS    promethazine (PHENERGAN) tablet 25 mg  25 mg Oral Q4H PRN    LORazepam (ATIVAN) 2 mg/mL injection 0.5 mg  0.5 mg IntraVENous Q6H PRN    hydrALAZINE (APRESOLINE) 20 mg/mL injection 10 mg  10 mg IntraVENous Q6H PRN    melatonin (rapid dissolve) tablet 5 mg  5 mg Oral QHS PRN       Imaging:   XR Results (most recent):  Results from Hospital Encounter encounter on 03/22/16    XR ABD ACUTE W 1 V CHEST    Narrative  Clinical history: Periumbilical pain    EXAMINATION:  Single view of the chest, supine and upright projections of the abdomen    Correlation: 1/1/2016    FINDINGS:  Trachea and heart size are within normal limits. Lungs are clear. There is no  free air beneath the diaphragm. No air-fluid levels are appreciated. Air is  present scattered throughout the colon. No dilated loops of small or large  bowel. Phleboliths are seen in the pelvis. Degenerative changes of the spine. Impression  :  1. No acute primary process. 2. Nonspecific bowel gas pattern. CT Results (most recent):  Results from Hospital Encounter encounter on 08/29/22    CT ABD PELV W CONT    Narrative  EXAM: CT ABD PELV W CONT    CLINICAL INDICATION/HISTORY: R/o Pyelo flank pain when urinating    TECHNIQUE: Contiguous axial images were obtained through the abdomen and pelvis. From these, sagittal and coronal reconstructions were generated.     Contrast : 100 cc [id number]    CT scans at this facility are performed using dose optimization technique as  appropriate with performed exam, to include automated exposure control,  adjustment of mA and/or kV according to patient's size (including appropriate  matching for site-specific examinations), or use of iterative reconstruction  technique. COMPARISON: None    FINDINGS:  Lower chest: Bilateral lung bases are clear. The base of the heart is within  limits. Liver: No focal lesions identified. Gallbladder/Biliary: No calcified gallstones identified. Spleen: Normal in size and contour. Pancreas: Within normal limits for technique. Kidneys, Urinary Bladder:  Symmetric and nonhydronephrotic. Left lateral bladder  diverticulum. Wall thickening in the superior urinary bladder. Adrenal Glands: Unremarkable    Bowels/Mesentery: Diverticulosis of the sigmoid colon with mild inflammatory  wall thickening of the mid sigmoid colon. There is a 4.4 x 2.9 x 1.6 cm  collection inferior to the sigmoid colon along superior margin of the urinary  bladder which is of the urinary bladder wall thickening. Small foci of air. Peritoneum/Abdominal Wall: No free air or free fluid identified. Pelvic organs: Unremarkable    Vascular: Aorta unremarkable for age. IVC unremarkable. Lymph Nodes: No adenopathy. Bones: No acute findings. Unremarkable for age. Impression  1. Sigmoid diverticulitis. Small collection interposed between the sigmoid  colon and urinary bladder may represent a diverticular abscess. Bladder wall  thickening along the superior margin adjacent to the collection could be  reactive. 2.  No hydronephrosis or perinephric fat stranding to suggest pyonephritis. 3.  Left lateral bladder diverticulum. MRI Results (most recent):  Results from [place] encounter on 08/29/22    MRI BRAIN WO CONT    Narrative  EXAM: MRI BRAIN WO CONT    CLINICAL INDICATION/HISTORY: Persistent nausea vomiting anxiety lower extremity  weakness symptoms suggest orthostatic hypotension    TECHNIQUE: Multisequence multiplanar MR imaging acquired through the brain.     Contrast used: None    COMPARISON: Head CT August 29, 2022    FINDINGS:    Parenchyma: Cerebellum demonstrates an old watershed infarction involving the  right hemisphere along its more superior aspect    Defect in the axial plane measuring 18 x 14 mm in the sagittal plane 14 mm    Today's diffusion images demonstrate no evidence of an acute infarction    Some minimal scattered gliotic changes in the hemispheres identified nonspecific    No evidence of a bleed    No mass lesion. CSF spaces: Ventricles and cisterns remain midline in position    IAC regions: Unremarkable    Parasellar region: Unremarkable    Vasculature: Appropriate flow voids within the major skull base vasculature. 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    No evidence of acute infarction bleed or mass identified        Labs:    Recent Results (from the past 48 hour(s))   HGB & HCT    Collection Time: 08/31/22  8:52 PM   Result Value Ref Range    HGB 11.2 (L) 13.0 - 16.0 g/dL    HCT 32.8 (L) 36.0 - 48.0 %   CBC WITH AUTOMATED DIFF    Collection Time: 09/01/22 10:07 AM   Result Value Ref Range    WBC 8.0 4.6 - 13.2 K/uL    RBC 3.75 (L) 4.35 - 5.65 M/uL    HGB 10.9 (L) 13.0 - 16.0 g/dL    HCT 32.6 (L) 36.0 - 48.0 %    MCV 86.9 78.0 - 100.0 FL    MCH 29.1 24.0 - 34.0 PG    MCHC 33.4 31.0 - 37.0 g/dL    RDW 13.9 11.6 - 14.5 %    PLATELET 039 117 - 001 K/uL    MPV 9.7 9.2 - 11.8 FL    NRBC 0.0 0  WBC    ABSOLUTE NRBC 0.00 0.00 - 0.01 K/uL    NEUTROPHILS 76 (H) 40 - 73 %    LYMPHOCYTES 12 (L) 21 - 52 %    MONOCYTES 7 3 - 10 %    EOSINOPHILS 5 0 - 5 %    BASOPHILS 1 0 - 2 %    IMMATURE GRANULOCYTES 0 0.0 - 0.5 %    ABS. NEUTROPHILS 6.1 1.8 - 8.0 K/UL    ABS. LYMPHOCYTES 1.0 0.9 - 3.6 K/UL    ABS. MONOCYTES 0.5 0.05 - 1.2 K/UL    ABS. EOSINOPHILS 0.4 0.0 - 0.4 K/UL    ABS. BASOPHILS 0.1 0.0 - 0.1 K/UL    ABS. IMM.  GRANS. 0.0 0.00 - 0.04 K/UL    DF AUTOMATED     METABOLIC PANEL, BASIC    Collection Time: 09/01/22 10:07 AM   Result Value Ref Range    Sodium 139 136 - 145 mmol/L    Potassium 3.0 (L) 3.5 - 5.5 mmol/L    Chloride 100 100 - 111 mmol/L    CO2 35 (H) 21 - 32 mmol/L    Anion gap 4 3.0 - 18 mmol/L    Glucose 94 74 - 99 mg/dL    BUN 11 7.0 - 18 MG/DL    Creatinine 0.80 0.6 - 1.3 MG/DL    BUN/Creatinine ratio 14 12 - 20      GFR est AA >60 >60 ml/min/1.73m2    GFR est non-AA >60 >60 ml/min/1.73m2    Calcium 8.4 (L) 8.5 - 35.4 MG/DL   METABOLIC PANEL, BASIC    Collection Time: 09/02/22 10:22 AM   Result Value Ref Range    Sodium 138 136 - 145 mmol/L    Potassium 2.9 (LL) 3.5 - 5.5 mmol/L    Chloride 101 100 - 111 mmol/L    CO2 32 21 - 32 mmol/L    Anion gap 5 3.0 - 18 mmol/L    Glucose 100 (H) 74 - 99 mg/dL    BUN 10 7.0 - 18 MG/DL    Creatinine 0.74 0.6 - 1.3 MG/DL    BUN/Creatinine ratio 14 12 - 20      GFR est AA >60 >60 ml/min/1.73m2    GFR est non-AA >60 >60 ml/min/1.73m2    Calcium 8.4 (L) 8.5 - 10.1 MG/DL   MAGNESIUM    Collection Time: 09/02/22 10:22 AM   Result Value Ref Range    Magnesium 1.7 1.6 - 2.6 mg/dL       Signed By: Pancho Aaron MD     September 2, 2022      I spent 25 minutes with the patient in face-to-face consultation, of which greater than 50% was spent in counseling and coordination of care as described above    Disclaimer: Sections of this note are dictated using utilizing voice recognition software. Minor typographical errors may be present. If questions arise, please do not hesitate to contact me or call our department.

## 2022-09-02 NOTE — PROGRESS NOTES
Problem: Falls - Risk of  Goal: *Absence of Falls  Description: Document Indio Reid Fall Risk and appropriate interventions in the flowsheet.   Outcome: Progressing Towards Goal  Note: Fall Risk Interventions:  Mobility Interventions: Communicate number of staff needed for ambulation/transfer, Patient to call before getting OOB    Mentation Interventions: Adequate sleep, hydration, pain control, Door open when patient unattended, Evaluate medications/consider consulting pharmacy, More frequent rounding, Update white board    Medication Interventions: Evaluate medications/consider consulting pharmacy, Patient to call before getting OOB, Teach patient to arise slowly    Elimination Interventions: Call light in reach, Patient to call for help with toileting needs, Urinal in reach    History of Falls Interventions: Evaluate medications/consider consulting pharmacy, Investigate reason for fall         Problem: Patient Education: Go to Patient Education Activity  Goal: Patient/Family Education  Outcome: Progressing Towards Goal     Problem: Patient Education: Go to Patient Education Activity  Goal: Patient/Family Education  Outcome: Progressing Towards Goal     Problem: TIA/CVA Stroke: 0-24 hours  Goal: Off Pathway (Use only if patient is Off Pathway)  Outcome: Progressing Towards Goal  Goal: Activity/Safety  Outcome: Progressing Towards Goal  Goal: Consults, if ordered  Outcome: Progressing Towards Goal  Goal: Diagnostic Test/Procedures  Outcome: Progressing Towards Goal  Goal: Nutrition/Diet  Outcome: Progressing Towards Goal  Goal: Discharge Planning  Outcome: Progressing Towards Goal  Goal: Medications  Outcome: Progressing Towards Goal  Goal: Respiratory  Outcome: Progressing Towards Goal  Goal: Treatments/Interventions/Procedures  Outcome: Progressing Towards Goal  Goal: Minimize risk of bleeding post-thrombolytic infusion  Outcome: Progressing Towards Goal  Goal: Monitor for complications post-thrombolytic infusion  Outcome: Progressing Towards Goal  Goal: Psychosocial  Outcome: Progressing Towards Goal  Goal: *Hemodynamically stable  Outcome: Progressing Towards Goal  Goal: *Neurologically stable  Description: Absence of additional neurological deficits    Outcome: Progressing Towards Goal  Goal: *Verbalizes anxiety and depression are reduced or absent  Outcome: Progressing Towards Goal  Goal: *Absence of Signs of Aspiration on Current Diet  Outcome: Progressing Towards Goal  Goal: *Absence of deep venous thrombosis signs and symptoms(Stroke Metric)  Outcome: Progressing Towards Goal  Goal: *Ability to perform ADLs and demonstrates progressive mobility and function  Outcome: Progressing Towards Goal  Goal: *Stroke education started(Stroke Metric)  Outcome: Progressing Towards Goal  Goal: *Dysphagia screen performed(Stroke Metric)  Outcome: Progressing Towards Goal  Goal: *Rehab consulted(Stroke Metric)  Outcome: Progressing Towards Goal     Problem: TIA/CVA Stroke: Day 2 Until Discharge  Goal: Off Pathway (Use only if patient is Off Pathway)  Outcome: Progressing Towards Goal  Goal: Activity/Safety  Outcome: Progressing Towards Goal  Goal: Diagnostic Test/Procedures  Outcome: Progressing Towards Goal  Goal: Nutrition/Diet  Outcome: Progressing Towards Goal  Goal: Discharge Planning  Outcome: Progressing Towards Goal  Goal: Medications  Outcome: Progressing Towards Goal  Goal: Respiratory  Outcome: Progressing Towards Goal  Goal: Treatments/Interventions/Procedures  Outcome: Progressing Towards Goal  Goal: Psychosocial  Outcome: Progressing Towards Goal  Goal: *Verbalizes anxiety and depression are reduced or absent  Outcome: Progressing Towards Goal  Goal: *Absence of aspiration  Outcome: Progressing Towards Goal  Goal: *Absence of deep venous thrombosis signs and symptoms(Stroke Metric)  Outcome: Progressing Towards Goal  Goal: *Optimal pain control at patient's stated goal  Outcome: Progressing Towards Goal  Goal: *Tolerating diet  Outcome: Progressing Towards Goal  Goal: *Ability to perform ADLs and demonstrates progressive mobility and function  Outcome: Progressing Towards Goal  Goal: *Stroke education continued(Stroke Metric)  Outcome: Progressing Towards Goal     Problem: Ischemic Stroke: Discharge Outcomes  Goal: *Verbalizes anxiety and depression are reduced or absent  Outcome: Progressing Towards Goal  Goal: *Verbalize understanding of risk factor modification(Stroke Metric)  Outcome: Progressing Towards Goal  Goal: *Hemodynamically stable  Outcome: Progressing Towards Goal  Goal: *Absence of aspiration pneumonia  Outcome: Progressing Towards Goal  Goal: *Aware of needed dietary changes  Outcome: Progressing Towards Goal  Goal: *Verbalize understanding of prescribed medications including anti-coagulants, anti-lipid, and/or anti-platelets(Stroke Metric)  Outcome: Progressing Towards Goal  Goal: *Tolerating diet  Outcome: Progressing Towards Goal  Goal: *Aware of follow-up diagnostics related to anticoagulants  Outcome: Progressing Towards Goal  Goal: *Ability to perform ADLs and demonstrates progressive mobility and function  Outcome: Progressing Towards Goal  Goal: *Absence of DVT(Stroke Metric)  Outcome: Progressing Towards Goal  Goal: *Absence of aspiration  Outcome: Progressing Towards Goal  Goal: *Optimal pain control at patient's stated goal  Outcome: Progressing Towards Goal  Goal: *Home safety concerns addressed  Outcome: Progressing Towards Goal  Goal: *Describes available resources and support systems  Outcome: Progressing Towards Goal  Goal: *Verbalizes understanding of activation of EMS(911) for stroke symptoms(Stroke Metric)  Outcome: Progressing Towards Goal  Goal: *Understands and describes signs and symptoms to report to providers(Stroke Metric)  Outcome: Progressing Towards Goal  Goal: *Neurolgocially stable (absence of additional neurological deficits)  Outcome: Progressing Towards Goal  Goal: *Verbalizes importance of follow-up with primary care physician(Stroke Metric)  Outcome: Progressing Towards Goal  Goal: *Smoking cessation discussed,if applicable(Stroke Metric)  Outcome: Progressing Towards Goal  Goal: *Depression screening completed(Stroke Metric)  Outcome: Progressing Towards Goal     Problem: Injury - Risk of, Adverse Drug Event  Goal: *Absence of adverse drug events  Outcome: Progressing Towards Goal  Goal: *Absence of medication errors  Outcome: Progressing Towards Goal  Goal: *Knowledge of prescribed medications  Outcome: Progressing Towards Goal     Problem: Patient Education: Go to Patient Education Activity  Goal: Patient/Family Education  Outcome: Progressing Towards Goal     Problem: Pain  Goal: *Control of Pain  Outcome: Progressing Towards Goal  Goal: *PALLIATIVE CARE:  Alleviation of Pain  Outcome: Progressing Towards Goal

## 2022-09-02 NOTE — PROGRESS NOTES
0700: Bedside shift change report given to Christian Cadena RN (oncoming nurse) by Milton Pacheco RN (offgoing nurse). Report included the following information SBAR, Kardex, Intake/Output, MAR, Recent Results, Med Rec Status, and Cardiac Rhythm NSR .      Wound Prevention Checklist    Patient: Renato Rivers (46 y.o. male)  Date: 9/2/2022  Diagnosis: TIA (transient ischemic attack) [G45.9]  LEYLA (acute kidney injury) ([facility] [address] [N17.9]  Hypokalemia [E87.6]  Hypotension [I95.9] <principal problem not specified>    Precautions: Fall       []  Heel prevention boots placed on patient    []  Patient turned q2h during shift    []  Lift team ordered    [x]  Patient on [place] bed/Specialty bed    []  Each Wound is documented during shift (Stage, Color, drainage, odor, measurements, and dressings)    [x]  Dual skin checks done at bedside during shift report with Parul Horton

## 2022-09-02 NOTE — PROGRESS NOTES
Comprehensive Nutrition Assessment    Type and Reason for Visit: Reassess, Positive nutrition screen    Nutrition Recommendations/Plan:   Continue nutrition interventions prescribed previously   Monitor PO intake, compliance of oral supplement, weight, labs, and plan of care during admission. Malnutrition Assessment:  Malnutrition Status: At risk for malnutrition (specify) (r/t poor PO intake PTA) (08/30/22 1203)      Nutrition Assessment:    Visited pt in room, laying in bed, sleeping but easily alert by calling name. Pt reported tolerating current diet and good acceptance of oral supplements-requested Vanilla only. Pt kept falling asleep during assessment. Per chart review, pt was negative for TIA but currently on ABX for small diverticular abscess; unable to place roger d/t hematuria. Current lab shows Potassium remains low (2.9)-replaced, and Calcium (8.4). Nutrition Related Findings:    Last BM PTA. Output: 150mL (urine). Pertinent Medications: potassium chloride, magnesium sulfate, lipitor, sodium chloride infusion, zofran, amlodipine, pepcid, MVI, thiamine, calcium carbonate, promethazine. Wound Type: None     Current Nutrition Intake & Therapies:  Average Meal Intake: 51-75%  Average Supplement Intake: 51-75%  ADULT ORAL NUTRITION SUPPLEMENT Breakfast; Standard High Calorie/High Protein  ADULT ORAL NUTRITION SUPPLEMENT Dinner; Frozen Supplement  ADULT DIET Regular; 3 carb choices (45 gm/meal); LIKES VANILLA ENSURES    Anthropometric Measures:  Height: 5' 7\" (170.2 cm)  Ideal Body Weight (IBW): 148 lbs (67 kg)  Admission Body Weight: 175 lb 0.7 oz  Current Body Wt:  77.7 kg (171 lb 4.8 oz), 115.7 % IBW. Bed scale  Current BMI (kg/m2): 26.8  Usual Body Weight: 79.4 kg (175 lb)  % Weight Change (Calculated): -2.1  Weight Adjustment: No adjustment  BMI Category: Overweight (BMI 25.0-29. 9)    Estimated Daily Nutrient Needs:  Energy Requirements Based On: Formula (MSJ x 1.2-1.4)  Weight Used for Energy Requirements: Current  Energy (kcal/day): 5321-0011  Weight Used for Protein Requirements: Current (0.8-1.0)  Protein (g/day): 62-78  Method Used for Fluid Requirements: 1 ml/kcal  Fluid (ml/day): 0100-1188    Nutrition Diagnosis:   Inadequate oral intake related to altered GI function, early satiety as evidenced by poor intake prior to admission, GI abnormality, nausea, vomiting    Nutrition Interventions:   Food and/or Nutrient Delivery: Continue current diet, Continue oral nutrition supplement, Vitamin supplement, Mineral supplement  Nutrition Education/Counseling: Education not indicated, No recommendations at this time  Coordination of Nutrition Care: Continue to monitor while inpatient  Plan of Care discussed with: patient    Goals:  Previous Goal Met: Progressing toward goal(s)  Goals: Meet at least 75% of estimated needs, by next RD assessment       Nutrition Monitoring and Evaluation:   Behavioral-Environmental Outcomes: None identified  Food/Nutrient Intake Outcomes: Diet advancement/tolerance, Food and nutrient intake, Supplement intake, Vitamin/mineral intake  Physical Signs/Symptoms Outcomes: Biochemical data, GI status, Meal time behavior, Hemodynamic status, Weight, Nutrition focused physical findings    Discharge Planning:    Continue oral nutrition supplement, Continue current diet    Tripp Louis MA, RDN, LD   Contact: [phone]

## 2022-09-02 NOTE — PROGRESS NOTES
Urology Progress Note        Assessment/Plan:     Patient Active Problem List   Diagnosis Code    Diverticulitis of colon (without mention of hemorrhage)(562.11) K57.32    Abdominal pain R10.9    Nausea and vomiting R11.2    Elevated blood pressure [id number]    Right bundle branch block I45.10    Psychiatric disorder F99    Gastrointestinal disorder K92.9    Hypertension I10    Anxiety and depression F41.9, F32. A    Arthritis M19.90    BPH (benign prostatic hyperplasia) N40.0    Constipation K59.00    Diverticula of colon K57.30    Erectile dysfunction due to diseases classified elsewhere N52.1    GERD (gastroesophageal reflux disease) K21.9    Hypopotassemia E87.6    Lower urinary tract symptoms (LUTS) R39.9    Peyronie's disease N48.6    Prostate disease N42.9    Wound, open, finger S61.209A    Backache M54.9    Lipid disorder E78.9    Myalgia and myositis, unspecified [id number]    Tobacco abuse Z72.0    Ventral hernia K43.9    ERRONEOUS ENCOUNTER--DISREGARD     Complete rotator cuff tear or rupture of right shoulder, not specified as traumatic M75.121    TIA (transient ischemic attack) G45.9    Hypokalemia E87.6    Hypotension I95.9    LEYLA (acute kidney injury) ([facility] [address] N17.9       ASSESSMENT:   - Gross hematuria 2/2 traumatic placement of catheter              Hgb 10.9<11.2<10.6<11.1     - BPH with LUTS  - UTI              Ucx 8/29/22 >100K E.coli              Normal WBC 8.0<11.8              Afebrile, VSS     - LEYLA- resolved              Creat 0.8<1.17<1.8<2.16              Baseline 0.6 in 6/2022                - Diverticular abscess, Sigmoid diverticulitis     PLAN:    - Appreciate consult  - Per colorectal surgeon, recommend PO ABX for abscess. No need for drainage with IR.  - CT reviewed, no acute  abnormalities. - Patient voiding ok at this time with PVR of 0 cc, 140s.  Will defer roger placement at this time.  - Gross hematuria improving per patient, only noticeable at end of stream.  - Continue Flomax for BPH, hesitancy, slow start to urinate. - Ucx shows E.coli , currently on Cefuroxime and Flagyl per primary  - Will sign off. Follow up arranged? Will have patient follow up OP for PVR, PSA. Esperanza Richey PA-C  [facility]  Available  [place]  Pager: [phone]     The history, physical and relevant labs/imaging were reviewed by Antonia Azar MD on 2022 and agree with the physician assistant's assessment and plan. Antonia Azar MD  [facility]           Subjective:     Daily Progress Note: 2022 3:24 PM    Glenora Dilling is doing well, complaints of some dysuria. Voiding without issues. Tmax 99.3    Objective:     Visit Vitals  /87 (BP 1 Location: Left upper arm, BP Patient Position: At rest)   Pulse 91   Temp 98.6 °F (37 °C)   Resp 18   Ht 5' 7\" (1.702 m)   Wt 79.4 kg (175 lb)   SpO2 97%   BMI 27.41 kg/m²        Temp (24hrs), Av.3 °F (36.8 °C), Min:97.9 °F (36.6 °C), Max:99.3 °F (37.4 °C)      Intake and Output:  1901 -  0700  In: 550 [P.O.:450; I.V.:100]  Out: 1330 [Urine:1330]   0701 -  1900  In: -   Out: 350 [Urine:350]    PHYSICAL EXAMINATION:   Visit Vitals  /87 (BP 1 Location: Left upper arm, BP Patient Position: At rest)   Pulse 91   Temp 98.6 °F (37 °C)   Resp 18   Ht 5' 7\" (1.702 m)   Wt 79.4 kg (175 lb)   SpO2 97%   BMI 27.41 kg/m²     Constitutional: Well developed, well nourished. No acute distress. HEENT: Normocephalic, Atraumatic, EOM's intact   CV:  no edema  Respiratory: No respiratory distress or difficulties breathing   Abdomen:  Soft and non-tender   Male:            PENIS: circumcised. No urine at bedside. Skin: No evidence of jaundice. Normal color  Neuro/Psych:  Alert and oriented. Affect appropriate. Lab/Data Review: All lab results for the last 24 hours reviewed. CT 22: IMPRESSION  1. Sigmoid diverticulitis.  Small collection interposed between the sigmoid  colon and urinary bladder may represent a diverticular abscess. Bladder wall  thickening along the superior margin adjacent to the collection could be  reactive. 2.  No hydronephrosis or perinephric fat stranding to suggest pyonephritis. 3.  Left lateral bladder diverticulum. Labs:     Labs: Results:   Chemistry    Recent Labs     09/01/22  1007   GLU 94      K 3.0*      CO2 35*   BUN 11   CREA 0.80   CA 8.4*   AGAP 4   BUCR 14        CBC w/Diff Recent Labs     09/01/22  1007 08/31/22 2052   WBC 8.0  --    RBC 3.75*  --    HGB 10.9* 11.2*   HCT 32.6* 32.8*     --    GRANS 76*  --    LYMPH 12*  --    EOS 5  --         Cultures No results for input(s): CULT in the last 72 hours.     All Micro Results       Procedure Component Value Units Date/Time    CULTURE, URINE [[id number]]  (Abnormal)  (Susceptibility) Collected: 08/29/22 1800    Order Status: Completed Specimen: Urine from Clean catch Updated: 09/02/22 0849     Special Requests: NO SPECIAL REQUESTS        [place] Count --        >100,000  COLONIES/mL       Culture result: ESCHERICHIA COLI       CULTURE, BODY FLUID [place] STAIN [[id number]]     Order Status: Sent Specimen: Drainage               Urinalysis Color   Date Value Ref Range Status   08/29/2022 DARK YELLOW   Final     Appearance   Date Value Ref Range Status   08/29/2022 TURBID   Final     Specific gravity   Date Value Ref Range Status   08/29/2022 1.022 1.005 - 1.030   Final     pH (UA)   Date Value Ref Range Status   08/29/2022 5.5 5.0 - 8.0   Final     Protein   Date Value Ref Range Status   08/29/2022 300 (A) NEG mg/dL Final     Ketone   Date Value Ref Range Status   08/29/2022 TRACE (A) NEG mg/dL Final     Bilirubin   Date Value Ref Range Status   08/29/2022 Negative NEG   Final     Blood   Date Value Ref Range Status   08/29/2022 LARGE (A) NEG   Final     Urobilinogen   Date Value Ref Range Status   08/29/2022 1.0 0.2 - 1.0 EU/dL Final     Nitrites   Date Value Ref Range Status 08/29/2022 Negative NEG   Final     Leukocyte Esterase   Date Value Ref Range Status   08/29/2022 LARGE (A) NEG   Final     Potassium   Date Value Ref Range Status   09/01/2022 3.0 (L) 3.5 - 5.5 mmol/L Final     Creatinine   Date Value Ref Range Status   09/01/2022 0.80 0.6 - 1.3 MG/DL Final     BUN   Date Value Ref Range Status   09/01/2022 11 7.0 - 18 MG/DL Final     Prostate Specific Ag   Date Value Ref Range Status   11/12/2021 1.21 0.00 - 4.00 ng/mL Final     Comment:     (Methodology: Roche ECLIA)           PSA No results for input(s): PSA in the last 72 hours.    Coagulation No results found for: PTP, INR, APTT, INREXT, INREXT
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
## 2022-09-03 VITALS
BODY MASS INDEX: 27.47 KG/M2 | WEIGHT: 175 LBS | HEART RATE: 81 BPM | HEIGHT: 67 IN | RESPIRATION RATE: 18 BRPM | TEMPERATURE: 98.2 F | SYSTOLIC BLOOD PRESSURE: 150 MMHG | OXYGEN SATURATION: 98 % | DIASTOLIC BLOOD PRESSURE: 83 MMHG

## 2022-09-03 LAB
ANION GAP SERPL CALC-SCNC: 4 MMOL/L (ref 3–18)
BUN SERPL-MCNC: 12 MG/DL (ref 7–18)
BUN/CREAT SERPL: 16 (ref 12–20)
CALCIUM SERPL-MCNC: 8.6 MG/DL (ref 8.5–10.1)
CHLORIDE SERPL-SCNC: 104 MMOL/L (ref 100–111)
CO2 SERPL-SCNC: 33 MMOL/L (ref 21–32)
CREAT SERPL-MCNC: 0.76 MG/DL (ref 0.6–1.3)
GLUCOSE SERPL-MCNC: 99 MG/DL (ref 74–99)
MAGNESIUM SERPL-MCNC: 2.2 MG/DL (ref 1.6–2.6)
POTASSIUM SERPL-SCNC: 3.3 MMOL/L (ref 3.5–5.5)
SODIUM SERPL-SCNC: 141 MMOL/L (ref 136–145)

## 2022-09-03 PROCEDURE — 74011250637 HC RX REV CODE- 250/637: Performed by: INTERNAL MEDICINE

## 2022-09-03 PROCEDURE — 74011250637 HC RX REV CODE- 250/637: Performed by: STUDENT IN AN ORGANIZED HEALTH CARE EDUCATION/TRAINING PROGRAM

## 2022-09-03 PROCEDURE — 74011250637 HC RX REV CODE- 250/637: Performed by: HOSPITALIST

## 2022-09-03 PROCEDURE — 74011250636 HC RX REV CODE- 250/636: Performed by: NURSE PRACTITIONER

## 2022-09-03 PROCEDURE — 80048 BASIC METABOLIC PNL TOTAL CA: CPT

## 2022-09-03 PROCEDURE — 74011250637 HC RX REV CODE- 250/637: Performed by: PHYSICIAN ASSISTANT

## 2022-09-03 PROCEDURE — 83735 ASSAY OF MAGNESIUM: CPT

## 2022-09-03 PROCEDURE — 74011250637 HC RX REV CODE- 250/637: Performed by: NURSE PRACTITIONER

## 2022-09-03 PROCEDURE — 99239 HOSP IP/OBS DSCHRG MGMT >30: CPT | Performed by: INTERNAL MEDICINE

## 2022-09-03 PROCEDURE — 36415 COLL VENOUS BLD VENIPUNCTURE: CPT

## 2022-09-03 RX ORDER — GUAIFENESIN 100 MG/5ML
81 LIQUID (ML) ORAL DAILY
Qty: 30 TABLET | Refills: 0 | Status: SHIPPED | OUTPATIENT
Start: 2022-09-04 | End: 2022-10-04

## 2022-09-03 RX ORDER — ATORVASTATIN CALCIUM 40 MG/1
40 TABLET, FILM COATED ORAL
Qty: 30 TABLET | Refills: 0 | Status: SHIPPED | OUTPATIENT
Start: 2022-09-03 | End: 2022-10-03

## 2022-09-03 RX ADMIN — METRONIDAZOLE 500 MG: 500 TABLET ORAL at 05:43

## 2022-09-03 RX ADMIN — AMLODIPINE BESYLATE 10 MG: 10 TABLET ORAL at 08:22

## 2022-09-03 RX ADMIN — ASPIRIN 81 MG CHEWABLE TABLET 81 MG: 81 TABLET CHEWABLE at 08:22

## 2022-09-03 RX ADMIN — THERA TABS 1 TABLET: TAB at 08:22

## 2022-09-03 RX ADMIN — TAMSULOSIN HYDROCHLORIDE 0.4 MG: 0.4 CAPSULE ORAL at 08:22

## 2022-09-03 RX ADMIN — FAMOTIDINE 20 MG: 20 TABLET ORAL at 08:22

## 2022-09-03 RX ADMIN — LORAZEPAM 1 MG: 1 TABLET ORAL at 08:22

## 2022-09-03 RX ADMIN — HEPARIN SODIUM 5000 UNITS: 5000 INJECTION INTRAVENOUS; SUBCUTANEOUS at 05:43

## 2022-09-03 RX ADMIN — Medication 100 MG: at 08:21

## 2022-09-03 NOTE — PROGRESS NOTES
0710: Bedside shift change report given to Marc Casarez RN (oncoming nurse) by Vicki Stock RN (offgoing nurse). Report included the following information SBAR, Kardex, Intake/Output, MAR, and Cardiac Rhythm NSR .     0710: Wound Prevention Checklist    Patient: Marco Antonio Kearns (36 y.o. male)  Date: 9/3/2022  Diagnosis: TIA (transient ischemic attack) [G45.9]  LEYLA (acute kidney injury) ([address] [N17.9]  Hypokalemia [E87.6]  Hypotension [I95.9] <principal problem not specified>    Precautions: Fall       []  Heel prevention boots placed on patient    []  Patient turned q2h during shift    []  Lift team ordered    []  Patient on [place] bed/Specialty bed    []  Each Wound is documented during shift (Stage, Color, drainage, odor, measurements, and dressings)    [x]  Dual skin checks done at bedside during shift report with Vicki Stock RN  Pt's skin is C/D/I    Gregory Pack RN     1030: Wife at bedside. 1155: Pt discharged to home.

## 2022-09-03 NOTE — PROGRESS NOTES
Problem: Falls - Risk of  Goal: *Absence of Falls  Description: Document Thom[place]ons Fall Risk and appropriate interventions in the flowsheet.   Outcome: Progressing Towards Goal  Note: Fall Risk Interventions:  Mobility Interventions: Bed/chair exit alarm    Mentation Interventions: Adequate sleep, hydration, pain control, Bed/chair exit alarm    Medication Interventions: Bed/chair exit alarm    Elimination Interventions: Bed/chair exit alarm, Call light in reach    History of Falls Interventions: Bed/chair exit alarm         Problem: TIA/CVA Stroke: 0-24 hours  Goal: Activity/Safety  Outcome: Progressing Towards Goal     Problem: TIA/CVA Stroke: 0-24 hours  Goal: Nutrition/Diet  Outcome: Progressing Towards Goal     Problem: TIA/CVA Stroke: 0-24 hours  Goal: Respiratory  Outcome: Progressing Towards Goal     Problem: TIA/CVA Stroke: 0-24 hours  Goal: *Neurologically stable  Description: Absence of additional neurological deficits    Outcome: Progressing Towards Goal

## 2022-09-03 NOTE — DISCHARGE SUMMARY
[facility]  [facility]       Patient: Damián Robertson Age: 57 y.o. : 1960 MR#: [id number] SSN: [id number]  PCP on record: Patrizia Barillas MD  Admit date: 2022  Discharge date: 9/3/2022    Consults:  -TRUDI Denny,-urology  - MERISSA Mauricio,-IR  -MELISSA Isabel,-GI  -MELISSA Lindquist,-ID  -MALLORY Davis,-neurology  Procedures:  -     Significant Diagnostic Studies:   - CT abd pelv w cont on 22: IMPRESSION  1. Sigmoid diverticulitis. Small collection interposed between the sigmoid  colon and urinary bladder may represent a diverticular abscess. Bladder wall  thickening along the superior margin adjacent to the collection could be  reactive. 2.  No hydronephrosis or perinephric fat stranding to suggest pyonephritis. 3.  Left lateral bladder diverticulum. -CT head on 22: IMPRESSION  No acute findings. Remote right cerebellar infarct. -MRI brain wo cont on 22: IMPRESSION     Evidence for old right cerebellar infarction     No evidence of acute infarction bleed or mass identified    -Echo on 22:    Left Ventricle: Normal left ventricular systolic function with a visually estimated EF of 55 - 60%. Left ventricle size is normal. Increased wall thickness. Findings consistent with mild concentric hypertrophy. Normal wall motion. Interatrial Septum: No interatrial shunt visualized with color Doppler. Grade 0 Absence of bubbles. Agitated saline study was negative with and without provocation. Aorta: Normal sized aortic root. Dilated ascending aorta. Ao Ascending diameter is 3.5 cm.   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 A    Arthritis M19.90    BPH (benign prostatic hyperplasia) N40.0    Constipation K59.00    Diverticula of colon K57.30    Erectile dysfunction due to diseases classified elsewhere N52.1    GERD (gastroesophageal reflux disease) K21.9    Hypopotassemia E87.6    Lower urinary tract symptoms (LUTS) R39.9    Peyronie's disease N48.6    Prostate disease N42.9    Wound, open, finger S61.209A    Backache M54.9    Lipid disorder E78.9    Myalgia and myositis, unspecified QTX1657    Tobacco abuse Z72.0    Ventral hernia K43.9    ERRONEOUS ENCOUNTER--DISREGARD     Complete rotator cuff tear or rupture of right shoulder, not specified as traumatic M75.121    TIA (transient ischemic attack) G45.9    Hypokalemia E87.6    Hypotension I95.9    LEYLA (acute kidney injury) [facility]) N17.9       Hospital Course by Problem   71-year-old male admitted after he presented to the emergency room with symptoms concerning for TIA including leg weakness and slurred speech. Work-up for TIA was noted to be negative. His presentation was likely do to the diverticular abscess and UTI      CT abdomen pelvis reviewed, showed diverticular abscess, colorectal surgery consulted, continued on broad-spectrum IV antibiotics, ID was consulted. Per the surgeon, the diverticular abscess was thought to be small that could likely possibly heal with antibiotic therapy. Pt dc'd on a regimen of oral ABx, to follow up with PCP for cont'd care. Pt had evidence of urinary retention but had trauma with roger placement and developed hematuria. Urology was consulted and ultimately it was deemed that the pt did not require a Roger catheter, patient has been started on Flomax. IR will not drain diverticular abscess. TIA-ruled out, MRI negative for acute stroke, symptoms have resolved.  Will decrease statin and asa dosing. UTI-On antibiotics, ID consulted, CT abdomen pelvis reviewed. Urine culture --> E.coli, pansensitive. CT abdomen pelvis showed diverticular abscess, colorectal surgery consulted, on antibiotics. Patient does not need diverticular abscess to be drained by IR at as previously thought. Hypokalemia-repleting, mag low normal, dc'd on oral regimen of potassium. Pt to follow up with pcp. History of hypertension-resumed on home medications. Urinary retention secondary to enlarged prostate-continue Flomax  DVT prophylaxis-Heparin  Full code          Today's examination of the patient revealed:     Subjective:   Pt had no complaints. Appeared comfortable. Objective:   VS: Visit Vitals  BP (!) 150/83 (BP 1 Location: Left upper arm, BP Patient Position: At rest)   Pulse 81   Temp 98.2 °F (36.8 °C)   Resp 18   Ht 5' 7\" (1.702 m)   Wt 79.4 kg (175 lb)   SpO2 98%   BMI 27.41 kg/m²      Tmax/24hrs: Temp (24hrs), Av.2 °F (36.8 °C), Min:98.1 °F (36.7 °C), Max:98.3 °F (36.8 °C)     Input/Output:   Intake/Output Summary (Last 24 hours) at 9/3/2022 0958  Last data filed at 9/3/2022 0745  Gross per 24 hour   Intake 150 ml   Output 150 ml   Net 0 ml       General:  Alert, cooperative, no acute distress    Pulmonary:  CTA Bilaterally. No Wheezing/Rhonchi/Rales. Cardiovascular: Regular rate and Rhythm. GI:  Soft, Non distended,+ Bowel sounds. LLQ pain   Extremities:  No edema, cyanosis, clubbing. No calf tenderness. Neurologic: Alert and oriented X 3. No acute neuro deficits.     Labs:    Recent Results (from the past 24 hour(s))   METABOLIC PANEL, BASIC    Collection Time: 22 10:22 AM   Result Value Ref Range    Sodium 138 136 - 145 mmol/L    Potassium 2.9 (LL) 3.5 - 5.5 mmol/L    Chloride 101 100 - 111 mmol/L    CO2 32 21 - 32 mmol/L    Anion gap 5 3.0 - 18 mmol/L    Glucose 100 (H) 74 - 99 mg/dL    BUN 10 7.0 - 18 MG/DL    Creatinine 0.74 0.6 - 1.3 MG/DL    BUN/Creatinine ratio 14 12 - 20      GFR est AA >60 >60 ml/min/1.73m2    GFR est non-AA >60 >60 ml/min/1.73m2    Calcium 8.4 (L) 8.5 - 10.1 MG/DL   MAGNESIUM    Collection Time: 09/02/22 10:22 AM   Result Value Ref Range    Magnesium 1.7 1.6 - 2.6 mg/dL   METABOLIC PANEL, BASIC    Collection Time: 09/03/22  1:43 AM   Result Value Ref Range    Sodium 141 136 - 145 mmol/L    Potassium 3.3 (L) 3.5 - 5.5 mmol/L    Chloride 104 100 - 111 mmol/L    CO2 33 (H) 21 - 32 mmol/L    Anion gap 4 3.0 - 18 mmol/L    Glucose 99 74 - 99 mg/dL    BUN 12 7.0 - 18 MG/DL    Creatinine 0.76 0.6 - 1.3 MG/DL    BUN/Creatinine ratio 16 12 - 20      GFR est AA >60 >60 ml/min/1.73m2    GFR est non-AA >60 >60 ml/min/1.73m2    Calcium 8.6 8.5 - 10.1 MG/DL   MAGNESIUM    Collection Time: 09/03/22  1:43 AM   Result Value Ref Range    Magnesium 2.2 1.6 - 2.6 mg/dL     Additional Data Reviewed:     Condition on discharge:stable   Disposition:    [x]Home   []Home with Home Health   []SNF/NH   []Rehab   []Home with family   []Alternate Facility:____________________      Discharge Medications:     Current Discharge Medication List        START taking these medications    Details   atorvastatin (LIPITOR) 40 mg tablet Take 1 Tablet by mouth nightly for 30 days. Qty: 30 Tablet, Refills: 0      aspirin 81 mg chewable tablet Take 1 Tablet by mouth daily for 30 days. Qty: 30 Tablet, Refills: 0      famotidine (PEPCID) 20 mg tablet Take 1 Tablet by mouth two (2) times a day for 30 days. Qty: 60 Tablet, Refills: 0      metroNIDAZOLE (FLAGYL) 500 mg tablet Take 1 Tablet by mouth two (2) times a day for 30 days. Indications: diverticulitis  Qty: 60 Tablet, Refills: 0      oxyCODONE-acetaminophen (PERCOCET) 5-325 mg per tablet Take 1 Tablet by mouth every six (6) hours as needed for Pain for up to 3 days. Max Daily Amount: 4 Tablets.  Indications: pain  Qty: 20 Tablet, Refills: 0    Associated Diagnoses: Diverticulitis of large intestine with abscess without bleeding      tamsulosin (FLOMAX) 0.4 mg capsule Take 1 Capsule by mouth daily for 30 days. Qty: 30 Capsule, Refills: 0      potassium chloride (K-DUR, KLOR-CON M20) 20 mEq tablet Take 2 Tablets by mouth daily for 6 days. Qty: 12 Tablet, Refills: 0      levoFLOXacin (LEVAQUIN) 500 mg tablet Take 1 Tablet by mouth daily for 30 days. Qty: 30 Tablet, Refills: 0           CONTINUE these medications which have NOT CHANGED    Details   melatonin 5 mg cap capsule Take 5 mg by mouth nightly. losartan (COZAAR) 25 mg tablet Take  by mouth daily. Indications: high blood pressure      amLODIPine (NORVASC) 5 mg tablet Take 5 mg by mouth nightly.      gabapentin (NEURONTIN) 300 mg capsule Take 1 Cap by mouth nightly. nortriptyline (PAMELOR) 25 mg capsule Take 25 mg by mouth nightly. multivitamin (ONE A DAY) tablet Take 1 Tab by mouth daily. aspirin delayed-release 81 mg tablet Take  by mouth daily. ascorbic acid, vitamin C, (VITAMIN C) 500 mg tablet Take 500 mg by mouth daily. cyanocobalamin, vitamin B-12, 5,000 mcg TbIE Take 1 Tab by mouth daily. omeprazole (PRILOSEC) 40 mg capsule Take 40 mg by mouth daily. LORazepam (ATIVAN) 1 mg tablet Take 1 mg by mouth daily. docusate sodium 100 mg tab Take 1 Tab by mouth daily. sildenafil, pulm. hypertension, (REVATIO) 20 mg tablet TAKE 5 TABLETS BY MOUTH ONE HOUR BEFORE SEXUAL ACTIVITY  Qty: 90 Tab, Refills: 3           STOP taking these medications       ibuprofen (MOTRIN) 800 mg tablet Comments:   Reason for Stopping:         potassium 99 mg tablet Comments:   Reason for Stopping: Follow-up Appointments:   1.  Your PCP: Christy Saleh MD, within 7-10days    >30 minutes spent coordinating this discharge (review instructions/follow-up, prescriptions, preparing report for sign off)    Signed:  Felix Easley MD  9/3/2022  9:58 AM

## 2022-09-03 NOTE — PROGRESS NOTES
Discharge planning    [place] orders noted but no recommendations. Spoke with patient concerning home health. Patient stated \" I don't need that. My wife is here ready to pick me up. \"    Discharge order noted for today. Pt declined home health services. Met with patient agreeable to the transition plan today. Transport has been arranged with wife. Updated bedside RN, Christopher/Zainab/Mery,  to the transition plan.        YOLANDA EricN, RN  Pager # [phone]  Care Manager

## 2022-09-03 NOTE — PROGRESS NOTES
1900: Bedside and Verbal shift change report given to Lilly Carroll RN (oncoming nurse) by Yasmin Bowen RN (offgoing nurse). Report included the following information SBAR, Kardex, and ED Summary. 0700: Bedside and Verbal shift change report given to *** (oncoming nurse) by Lilly Carroll RN (offgoing nurse). Report included the following information SBAR, Kardex, and ED Summary.

## 2022-09-26 ENCOUNTER — OFFICE VISIT (OUTPATIENT)
Dept: SURGERY | Age: 62
End: 2022-09-26
Payer: COMMERCIAL

## 2022-09-26 VITALS
RESPIRATION RATE: 18 BRPM | HEART RATE: 88 BPM | BODY MASS INDEX: 26.84 KG/M2 | TEMPERATURE: 98 F | OXYGEN SATURATION: 97 % | WEIGHT: 171 LBS | HEIGHT: 67 IN | SYSTOLIC BLOOD PRESSURE: 132 MMHG | DIASTOLIC BLOOD PRESSURE: 70 MMHG

## 2022-09-26 DIAGNOSIS — K57.32 DIVERTICULITIS OF COLON: Primary | ICD-10-CM

## 2022-09-26 PROCEDURE — 99214 OFFICE O/P EST MOD 30 MIN: CPT | Performed by: COLON & RECTAL SURGERY

## 2022-09-26 NOTE — LETTER
9/26/2022    Patient: Anna Marie Berry   YOB: 1960   Date of Visit: 9/26/2022     Brigitte Morel MD  [address]  [address] [address] [address]  Via Fax: [phone]    Dear Benjy Salgado,    I saw Josh Mendez in the office today after recent hospitalization for diverticulitis with abscess. He is currently pain-free and tolerating diet. He has had a couple episodes of vomiting, however. His exam is benign. He has had multiply recurrent disease and recently had pelvic abscess abutting the bladder. I have recommended robotic sigmoid colectomy to the patient. He has concerns about missing work. He is at risk for further attacks of diverticulitis as well as colovesical fistula. He will consider Kim let us know if he would like to proceed. If you have questions, please do not hesitate to call me. I look forward to following your patient along with you.       Sincerely,    Marlo Valera MD

## 2022-09-26 NOTE — PROGRESS NOTES
Chief Complaint   Patient presents with    Follow-up     Sigmoid diverticulitis,     1. Have you been to the ER, urgent care clinic since your last visit? Hospitalized since your last visit? No    2. Have you seen or consulted any other health care providers outside of the [address] [place] [address] since your last visit? Include any pap smears or colon screening.  No No
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
## 2022-10-05 DIAGNOSIS — K57.32 DIVERTICULITIS OF COLON: Primary | ICD-10-CM

## 2022-10-05 RX ORDER — LEVOFLOXACIN 500 MG/1
500 TABLET, FILM COATED ORAL DAILY
Qty: 14 TABLET | Refills: 0 | Status: SHIPPED | OUTPATIENT
Start: 2022-10-05 | End: 2022-10-19

## 2022-10-05 RX ORDER — METRONIDAZOLE 500 MG/1
500 TABLET ORAL 3 TIMES DAILY
Qty: 42 TABLET | Refills: 0 | Status: SHIPPED | OUTPATIENT
Start: 2022-10-05 | End: 2022-10-19

## 2022-10-13 ENCOUNTER — OFFICE VISIT (OUTPATIENT)
Dept: SURGERY | Age: 62
End: 2022-10-13
Payer: COMMERCIAL

## 2022-10-13 VITALS
OXYGEN SATURATION: 98 % | RESPIRATION RATE: 17 BRPM | SYSTOLIC BLOOD PRESSURE: 131 MMHG | TEMPERATURE: 97.7 F | HEART RATE: 103 BPM | HEIGHT: 67 IN | DIASTOLIC BLOOD PRESSURE: 70 MMHG | BODY MASS INDEX: 26.53 KG/M2 | WEIGHT: 169 LBS

## 2022-10-13 DIAGNOSIS — K57.32 DIVERTICULITIS OF COLON: ICD-10-CM

## 2022-10-13 DIAGNOSIS — K57.32 DIVERTICULITIS OF COLON: Primary | ICD-10-CM

## 2022-10-13 PROCEDURE — 99214 OFFICE O/P EST MOD 30 MIN: CPT | Performed by: COLON & RECTAL SURGERY

## 2022-10-13 RX ORDER — NEOMYCIN SULFATE 500 MG/1
1000 TABLET ORAL 3 TIMES DAILY
Qty: 6 TABLET | Refills: 0 | Status: SHIPPED | OUTPATIENT
Start: 2022-10-13 | End: 2022-10-14

## 2022-10-13 RX ORDER — METRONIDAZOLE 500 MG/1
500 TABLET ORAL 3 TIMES DAILY
Qty: 3 TABLET | Refills: 0 | Status: SHIPPED | OUTPATIENT
Start: 2022-10-13 | End: 2022-10-14

## 2022-10-13 NOTE — PROGRESS NOTES
Subjective: Pain is resolved. He still has nausea and occasional vomiting. He is using MiraLAX and stool softeners to keep his bowels soft    Past medical history and ROS were reviewed and unchanged. Abdomen: Soft, nontender nondistended    Assessment / Plan    Diverticulitis with abscess, pelvic  Schedule robotic sigmoid colectomy  Bilateral ureteral stents given the proximity to his bladder  We will do a 2-day prep to see if we can clear things out as well as possible  We should open the specimen at the time of surgery to rule out cancer as all prior colonoscopies were incomplete and virtual colonoscopy in the past was with poor prep  Thorough discussion with patient regarding surgery  He has primary midline laparotomy for exploratory laparotomy, he may require extensive lysis of adhesions    30 minutes was spent in patient care. The diagnoses and plan were discussed with patient. All questions answered. Plan of care agreed to by all concerned.

## 2022-10-15 ENCOUNTER — APPOINTMENT (OUTPATIENT)
Dept: CT IMAGING | Age: 62
End: 2022-10-15
Attending: EMERGENCY MEDICINE
Payer: COMMERCIAL

## 2022-10-15 ENCOUNTER — HOSPITAL ENCOUNTER (EMERGENCY)
Age: 62
Discharge: ANOTHER HOSPITAL | End: 2022-10-15
Attending: EMERGENCY MEDICINE
Payer: COMMERCIAL

## 2022-10-15 VITALS
OXYGEN SATURATION: 96 % | HEART RATE: 94 BPM | RESPIRATION RATE: 20 BRPM | DIASTOLIC BLOOD PRESSURE: 109 MMHG | TEMPERATURE: 98.3 F | SYSTOLIC BLOOD PRESSURE: 172 MMHG

## 2022-10-15 DIAGNOSIS — R11.2 NAUSEA AND VOMITING, UNSPECIFIED VOMITING TYPE: Primary | ICD-10-CM

## 2022-10-15 DIAGNOSIS — N32.1 COLOVESICAL FISTULA: ICD-10-CM

## 2022-10-15 DIAGNOSIS — K57.92 DIVERTICULITIS: ICD-10-CM

## 2022-10-15 LAB
ALBUMIN SERPL-MCNC: 3.7 G/DL (ref 3.4–5)
ALBUMIN/GLOB SERPL: 1.2 {RATIO} (ref 0.8–1.7)
ALP SERPL-CCNC: 78 U/L (ref 45–117)
ALT SERPL-CCNC: 24 U/L (ref 16–61)
AMORPH CRY URNS QL MICRO: ABNORMAL
ANION GAP SERPL CALC-SCNC: 5 MMOL/L (ref 3–18)
APPEARANCE UR: CLEAR
AST SERPL-CCNC: 14 U/L (ref 10–38)
BACTERIA URNS QL MICRO: NEGATIVE /HPF
BASOPHILS # BLD: 0 K/UL (ref 0–0.1)
BASOPHILS NFR BLD: 1 % (ref 0–2)
BILIRUB SERPL-MCNC: 0.4 MG/DL (ref 0.2–1)
BILIRUB UR QL: NEGATIVE
BUN SERPL-MCNC: 11 MG/DL (ref 7–18)
BUN/CREAT SERPL: 13 (ref 12–20)
CALCIUM SERPL-MCNC: 9.5 MG/DL (ref 8.5–10.1)
CHLORIDE SERPL-SCNC: 105 MMOL/L (ref 100–111)
CO2 SERPL-SCNC: 30 MMOL/L (ref 21–32)
COLOR UR: YELLOW
CREAT SERPL-MCNC: 0.83 MG/DL (ref 0.6–1.3)
DIFFERENTIAL METHOD BLD: ABNORMAL
EOSINOPHIL # BLD: 0.1 K/UL (ref 0–0.4)
EOSINOPHIL NFR BLD: 1 % (ref 0–5)
EPITH CASTS URNS QL MICRO: ABNORMAL /LPF (ref 0–5)
ERYTHROCYTE [DISTWIDTH] IN BLOOD BY AUTOMATED COUNT: 13 % (ref 11.6–14.5)
GLOBULIN SER CALC-MCNC: 3.2 G/DL (ref 2–4)
GLUCOSE SERPL-MCNC: 99 MG/DL (ref 74–99)
GLUCOSE UR STRIP.AUTO-MCNC: NEGATIVE MG/DL
HCT VFR BLD AUTO: 38.4 % (ref 36–48)
HGB BLD-MCNC: 12.9 G/DL (ref 13–16)
HGB UR QL STRIP: NEGATIVE
IMM GRANULOCYTES # BLD AUTO: 0 K/UL (ref 0–0.04)
IMM GRANULOCYTES NFR BLD AUTO: 0 % (ref 0–0.5)
KETONES UR QL STRIP.AUTO: 80 MG/DL
LEUKOCYTE ESTERASE UR QL STRIP.AUTO: ABNORMAL
LIPASE SERPL-CCNC: 159 U/L (ref 73–393)
LYMPHOCYTES # BLD: 1.3 K/UL (ref 0.9–3.6)
LYMPHOCYTES NFR BLD: 17 % (ref 21–52)
MCH RBC QN AUTO: 28.8 PG (ref 24–34)
MCHC RBC AUTO-ENTMCNC: 33.6 G/DL (ref 31–37)
MCV RBC AUTO: 85.7 FL (ref 78–100)
MONOCYTES # BLD: 0.6 K/UL (ref 0.05–1.2)
MONOCYTES NFR BLD: 9 % (ref 3–10)
NEUTS SEG # BLD: 5.4 K/UL (ref 1.8–8)
NEUTS SEG NFR BLD: 73 % (ref 40–73)
NITRITE UR QL STRIP.AUTO: NEGATIVE
NRBC # BLD: 0 K/UL (ref 0–0.01)
NRBC BLD-RTO: 0 PER 100 WBC
PH UR STRIP: 6.5 [PH] (ref 5–8)
PLATELET # BLD AUTO: 312 K/UL (ref 135–420)
PMV BLD AUTO: 10.3 FL (ref 9.2–11.8)
POTASSIUM SERPL-SCNC: 3.5 MMOL/L (ref 3.5–5.5)
PROT SERPL-MCNC: 6.9 G/DL (ref 6.4–8.2)
PROT UR STRIP-MCNC: 30 MG/DL
RBC # BLD AUTO: 4.48 M/UL (ref 4.35–5.65)
RBC #/AREA URNS HPF: ABNORMAL /HPF (ref 0–5)
SODIUM SERPL-SCNC: 140 MMOL/L (ref 136–145)
SP GR UR REFRACTOMETRY: 1.02 (ref 1–1.03)
UROBILINOGEN UR QL STRIP.AUTO: 1 EU/DL (ref 0.2–1)
WBC # BLD AUTO: 7.5 K/UL (ref 4.6–13.2)
WBC URNS QL MICRO: ABNORMAL /HPF (ref 0–4)

## 2022-10-15 PROCEDURE — 96375 TX/PRO/DX INJ NEW DRUG ADDON: CPT

## 2022-10-15 PROCEDURE — 83690 ASSAY OF LIPASE: CPT

## 2022-10-15 PROCEDURE — 99285 EMERGENCY DEPT VISIT HI MDM: CPT

## 2022-10-15 PROCEDURE — 81001 URINALYSIS AUTO W/SCOPE: CPT

## 2022-10-15 PROCEDURE — 96374 THER/PROPH/DIAG INJ IV PUSH: CPT

## 2022-10-15 PROCEDURE — 74011250636 HC RX REV CODE- 250/636: Performed by: EMERGENCY MEDICINE

## 2022-10-15 PROCEDURE — 80053 COMPREHEN METABOLIC PANEL: CPT

## 2022-10-15 PROCEDURE — 74177 CT ABD & PELVIS W/CONTRAST: CPT

## 2022-10-15 PROCEDURE — 74011000636 HC RX REV CODE- 636: Performed by: EMERGENCY MEDICINE

## 2022-10-15 PROCEDURE — 96361 HYDRATE IV INFUSION ADD-ON: CPT

## 2022-10-15 PROCEDURE — 85025 COMPLETE CBC W/AUTO DIFF WBC: CPT

## 2022-10-15 RX ORDER — ONDANSETRON 2 MG/ML
4 INJECTION INTRAMUSCULAR; INTRAVENOUS
Status: COMPLETED | OUTPATIENT
Start: 2022-10-15 | End: 2022-10-15

## 2022-10-15 RX ORDER — HYDROCODONE BITARTRATE AND ACETAMINOPHEN 5; 325 MG/1; MG/1
1 TABLET ORAL
Qty: 12 TABLET | Refills: 0 | Status: SHIPPED | OUTPATIENT
Start: 2022-10-15 | End: 2022-10-18

## 2022-10-15 RX ORDER — AMOXICILLIN AND CLAVULANATE POTASSIUM 875; 125 MG/1; MG/1
1 TABLET, FILM COATED ORAL 2 TIMES DAILY
Qty: 20 TABLET | Refills: 0 | Status: SHIPPED | OUTPATIENT
Start: 2022-10-15 | End: 2022-10-25

## 2022-10-15 RX ORDER — ONDANSETRON 4 MG/1
4 TABLET, ORALLY DISINTEGRATING ORAL
Qty: 20 TABLET | Refills: 0 | Status: SHIPPED | OUTPATIENT
Start: 2022-10-15

## 2022-10-15 RX ORDER — MORPHINE SULFATE 4 MG/ML
4 INJECTION INTRAVENOUS
Status: COMPLETED | OUTPATIENT
Start: 2022-10-15 | End: 2022-10-15

## 2022-10-15 RX ORDER — NALOXONE HYDROCHLORIDE 4 MG/.1ML
SPRAY NASAL
Qty: 2 EACH | Refills: 0 | Status: SHIPPED | OUTPATIENT
Start: 2022-10-15

## 2022-10-15 RX ADMIN — SODIUM CHLORIDE 1000 ML: 9 INJECTION, SOLUTION INTRAVENOUS at 18:23

## 2022-10-15 RX ADMIN — IOPAMIDOL 100 ML: 612 INJECTION, SOLUTION INTRAVENOUS at 19:02

## 2022-10-15 RX ADMIN — SODIUM CHLORIDE 1000 ML: 900 INJECTION, SOLUTION INTRAVENOUS at 18:16

## 2022-10-15 RX ADMIN — ONDANSETRON 4 MG: 2 INJECTION INTRAMUSCULAR; INTRAVENOUS at 19:36

## 2022-10-15 RX ADMIN — MORPHINE SULFATE 4 MG: 4 INJECTION, SOLUTION INTRAMUSCULAR; INTRAVENOUS at 19:37
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
## 2022-10-15 NOTE — ED NOTES
Patient presents to the ED with complaints of N/V that has been going on for the last 4  days. Hx of hypokalemia.      States he hasnt been able to keep anything down    Denies any ETOH

## 2025-07-05 NOTE — PROGRESS NOTES
I got a call from nursing to inform me that patient started having hematuria after daytime nurse attempted to place roger. Advised her to check H/H now. Called urology on call service and spoke to PA (Oseas Talavera 238) about it. Her service will evaluate this patient      I got a call from nurse stating that urologist has put Roger catheter. We will hold heparin dose for DVT prophylaxis for tonight. Patient also has gag reflex and is requesting to give him omeprazole. We will give him 1 dose of Protonix. Natalia

## (undated) DEVICE — ENDOSCOPY PUMP TUBING/ CAP SET: Brand: ERBE

## (undated) DEVICE — CANNULA ORIG TL CLR W FOAM CUSHIONS AND 14FT SUPL TB 3 CHN

## (undated) DEVICE — SYRINGE MED 25GA 3ML L5/8IN SUBQ PLAS W/ DETACH NDL SFTY

## (undated) DEVICE — CATHETER SUCT TR FL TIP 14FR W/ O CTRL

## (undated) DEVICE — GAUZE,SPONGE,4"X4",16PLY,STRL,LF,10/TRAY: Brand: MEDLINE

## (undated) DEVICE — MEDI-VAC NON-CONDUCTIVE SUCTION TUBING: Brand: CARDINAL HEALTH

## (undated) DEVICE — GOWN ISOL IMPERV UNIV, DISP, OPEN BACK, BLUE --

## (undated) DEVICE — CANNULA NSL AD TBNG L14FT STD PVC O2 CRV CONN NONFLARED NSL

## (undated) DEVICE — SYR 10ML LUER LOK 1/5ML GRAD --

## (undated) DEVICE — SYR 20ML LL STRL LF --

## (undated) DEVICE — YANKAUER,SMOOTH HANDLE,HIGH CAPACITY: Brand: MEDLINE INDUSTRIES, INC.

## (undated) DEVICE — SOLUTION IRRIG 1000ML H2O STRL BLT

## (undated) DEVICE — SYR 50ML SLIP TIP NSAF LF STRL --

## (undated) DEVICE — LINER SUCT CANSTR 3000CC PLAS SFT PRE ASSEMB W/OUT TBNG W/

## (undated) DEVICE — FLUFF AND POLYMER UNDERPAD,EXTRA HEAVY: Brand: WINGS